# Patient Record
Sex: FEMALE | Race: WHITE | NOT HISPANIC OR LATINO | Employment: UNEMPLOYED | URBAN - NONMETROPOLITAN AREA
[De-identification: names, ages, dates, MRNs, and addresses within clinical notes are randomized per-mention and may not be internally consistent; named-entity substitution may affect disease eponyms.]

---

## 2017-08-29 RX ORDER — ACETAMINOPHEN AND CODEINE PHOSPHATE 120; 12 MG/5ML; MG/5ML
1 SOLUTION ORAL DAILY
Qty: 28 TABLET | Refills: 1 | Status: SHIPPED | OUTPATIENT
Start: 2017-08-29 | End: 2017-08-30 | Stop reason: SDUPTHER

## 2017-08-29 RX ORDER — NORETHINDRONE ACETATE AND ETHINYL ESTRADIOL 1; .02 MG/1; MG/1
1 TABLET ORAL DAILY
Qty: 21 TABLET | Refills: 2 | Status: CANCELLED | OUTPATIENT
Start: 2017-08-29

## 2017-08-29 RX ORDER — ACETAMINOPHEN AND CODEINE PHOSPHATE 120; 12 MG/5ML; MG/5ML
1 SOLUTION ORAL DAILY
Qty: 28 TABLET | Refills: 1 | Status: SHIPPED | OUTPATIENT
Start: 2017-08-29 | End: 2017-08-29 | Stop reason: SDUPTHER

## 2017-08-29 NOTE — TELEPHONE ENCOUNTER
I am not sure where microgestin came from as I went and reviewed old chart and patient was on micronor minipill-I called patient to confirm and she is on micronor not microgestin so may send in 2 months refills on micronor-thanks

## 2017-08-30 RX ORDER — ACETAMINOPHEN AND CODEINE PHOSPHATE 120; 12 MG/5ML; MG/5ML
1 SOLUTION ORAL DAILY
Qty: 28 TABLET | Refills: 1 | Status: SHIPPED | OUTPATIENT
Start: 2017-08-30 | End: 2017-10-12 | Stop reason: SDUPTHER

## 2017-08-30 RX ORDER — ACETAMINOPHEN AND CODEINE PHOSPHATE 120; 12 MG/5ML; MG/5ML
1 SOLUTION ORAL DAILY
Qty: 28 TABLET | Refills: 1 | Status: SHIPPED | OUTPATIENT
Start: 2017-08-30 | End: 2017-08-30 | Stop reason: SDUPTHER

## 2017-10-12 ENCOUNTER — OFFICE VISIT (OUTPATIENT)
Dept: OBSTETRICS AND GYNECOLOGY | Facility: CLINIC | Age: 27
End: 2017-10-12

## 2017-10-12 VITALS
DIASTOLIC BLOOD PRESSURE: 62 MMHG | WEIGHT: 136 LBS | HEIGHT: 63 IN | SYSTOLIC BLOOD PRESSURE: 112 MMHG | BODY MASS INDEX: 24.1 KG/M2

## 2017-10-12 DIAGNOSIS — Z12.4 SCREENING FOR MALIGNANT NEOPLASM OF CERVIX: ICD-10-CM

## 2017-10-12 DIAGNOSIS — L70.9 ACNE, UNSPECIFIED ACNE TYPE: ICD-10-CM

## 2017-10-12 DIAGNOSIS — R79.89 ELEVATED TESTOSTERONE LEVEL IN FEMALE: ICD-10-CM

## 2017-10-12 DIAGNOSIS — Z01.419 ENCOUNTER FOR GYNECOLOGICAL EXAMINATION WITHOUT ABNORMAL FINDING: Primary | ICD-10-CM

## 2017-10-12 PROCEDURE — 99395 PREV VISIT EST AGE 18-39: CPT | Performed by: PHYSICIAN ASSISTANT

## 2017-10-12 RX ORDER — ACETAMINOPHEN AND CODEINE PHOSPHATE 120; 12 MG/5ML; MG/5ML
1 SOLUTION ORAL DAILY
Qty: 28 TABLET | Refills: 12 | Status: SHIPPED | OUTPATIENT
Start: 2017-10-12 | End: 2018-04-25

## 2017-10-12 NOTE — PATIENT INSTRUCTIONS
Will rto for fasting labs  Pending results trial of spironolactone  If DHEAS levels higher than previously found will repeat imaging

## 2017-10-12 NOTE — PROGRESS NOTES
Subjective   Chief Complaint   Patient presents with   • Gynecologic Exam     wants to discuss previous 2015 abnormal DHEA level.    • Contraception     Needs refills on Micronor       Karmen Tracey is a 27 y.o. year old  presenting to be seen for her annual gynecological exam.   She is  and using micronor for birth control and had elevated blood pressure on combination pills in the past  She occasionally has irregular bleeding with minipill  She had had abnormal hormone levels in the past with elevated testosterone and DHEAS-has seen endocrinologist with multiple repeat labs and persistently elevated level of DHEAS 700-900 level  She had negative pelvic ultrasound, negative CT scan of adrenal and negative MRI  Patient reports persistent severe acne which has been problematic for several years   Patient's last menstrual period was 2017.    Past Medical History:   Diagnosis Date   • Anxiety    • History of Papanicolaou smear of cervix     normal   • Oral contraceptive use    • Personal history of leukemia         Current Outpatient Prescriptions:   •  cetirizine (ZYRTEC ALLERGY) 10 MG tablet, Take  by mouth daily., Disp: , Rfl:   •  Multiple Vitamins tablet, Take  by mouth daily., Disp: , Rfl:   •  norethindrone (MICRONOR) 0.35 MG tablet, Take 1 tablet by mouth Daily., Disp: 28 tablet, Rfl: 12   Allergies   Allergen Reactions   • Amoxicillin-Pot Clavulanate    • Cefaclor       Past Surgical History:   Procedure Laterality Date   • ADENOIDECTOMY     • TONSILLECTOMY     • WISDOM TOOTH EXTRACTION        Social History     Social History   • Marital status:      Spouse name: N/A   • Number of children: N/A   • Years of education: N/A     Occupational History   • Not on file.     Social History Main Topics   • Smoking status: Never Smoker   • Smokeless tobacco: Never Used   • Alcohol use No      Comment: Social alcohol use   • Drug use: No   • Sexual activity: Yes      "Partners: Male     Birth control/ protection: OCP     Other Topics Concern   • Not on file     Social History Narrative      Family History   Problem Relation Age of Onset   • Hypertension Other    • Migraines Other    • Diabetes Other    • Arthritis Other    • Cancer Other    • Stroke Other    • Heart attack Other    • Breast cancer Maternal Grandmother        Review of Systems   Constitutional: Negative.    Gastrointestinal: Negative.    Genitourinary: Negative.            Objective   /62  Ht 63\" (160 cm)  Wt 136 lb (61.7 kg)  LMP 09/21/2017  Breastfeeding? No  BMI 24.09 kg/m2    Physical Exam   Constitutional: She appears well-developed and well-nourished. She is cooperative.   Pulmonary/Chest: Right breast exhibits no inverted nipple, no mass, no nipple discharge, no skin change and no tenderness. Left breast exhibits no inverted nipple, no mass, no nipple discharge, no skin change and no tenderness.   Abdominal: Soft. Normal appearance. There is no hepatosplenomegaly. There is no tenderness.   Genitourinary: Vagina normal and uterus normal. There is no tenderness or lesion on the right labia. There is no tenderness or lesion on the left labia. Cervix exhibits no motion tenderness and no discharge. Right adnexum displays no mass and no tenderness. Left adnexum displays no mass and no tenderness.   Neurological: She is alert.   Skin: Skin is warm, dry and intact.   Psychiatric: She has a normal mood and affect. Her behavior is normal.            Assessment and Plan  Karmen was seen today for gynecologic exam and contraception.    Diagnoses and all orders for this visit:    Encounter for gynecological examination without abnormal finding    Screening for malignant neoplasm of cervix  -     Liquid-based Pap Smear, Screening - ThinPrep Vial, Cervix; Future    Acne, unspecified acne type    Elevated testosterone level in female  -     Basic Metabolic Panel; Future  -     Insulin, Total; Future  -     " Testosterone (Free & Total), LC / MS; Future  -     DHEA-Sulfate; Future    Other orders  -     norethindrone (MICRONOR) 0.35 MG tablet; Take 1 tablet by mouth Daily.      Patient Instructions   Will rto for fasting labs  Pending results trial of spironolactone  If DHEAS levels higher than previously found will repeat imaging              This note was electronically signed.    Sushma Prakash PA-C   October 12, 2017

## 2017-10-17 ENCOUNTER — RESULTS ENCOUNTER (OUTPATIENT)
Dept: OBSTETRICS AND GYNECOLOGY | Facility: CLINIC | Age: 27
End: 2017-10-17

## 2017-10-17 DIAGNOSIS — R79.89 ELEVATED TESTOSTERONE LEVEL IN FEMALE: ICD-10-CM

## 2017-10-24 DIAGNOSIS — Z12.4 SCREENING FOR MALIGNANT NEOPLASM OF CERVIX: ICD-10-CM

## 2017-10-25 ENCOUNTER — TELEPHONE (OUTPATIENT)
Dept: OBSTETRICS AND GYNECOLOGY | Facility: CLINIC | Age: 27
End: 2017-10-25

## 2017-10-25 DIAGNOSIS — N63.0 BREAST NODULE: Primary | ICD-10-CM

## 2017-10-25 NOTE — TELEPHONE ENCOUNTER
----- Message from Stacy Carey sent at 10/25/2017 10:11 AM EDT -----  Contact: PT  THIS IS PAT'S PT.  SHE IS HAVING A 6 MONTH F/U LEFT BREAST US AND NEEDS ORDER FAXED TO [facility] [phone].  THANKS

## 2017-11-11 LAB
BUN SERPL-MCNC: 11 MG/DL (ref 7–20)
BUN/CREAT SERPL: 13.8 (ref 7.1–23.5)
CALCIUM SERPL-MCNC: 9.9 MG/DL (ref 8.4–10.2)
CHLORIDE SERPL-SCNC: 105 MMOL/L (ref 98–107)
CO2 SERPL-SCNC: 24 MMOL/L (ref 26–30)
CREAT SERPL-MCNC: 0.8 MG/DL (ref 0.6–1.3)
DHEA-S SERPL-MCNC: 27.2 UG/DL (ref 84.8–378)
GFR SERPLBLD CREATININE-BSD FMLA CKD-EPI: 105 ML/MIN/1.73
GFR SERPLBLD CREATININE-BSD FMLA CKD-EPI: 87 ML/MIN/1.73
GLUCOSE SERPL-MCNC: 93 MG/DL (ref 74–98)
INSULIN SERPL-ACNC: 7.1 UIU/ML (ref 2.6–24.9)
POTASSIUM SERPL-SCNC: 4 MMOL/L (ref 3.5–5.1)
SODIUM SERPL-SCNC: 144 MMOL/L (ref 137–145)
TESTOST FREE SERPL-MCNC: 4.7 PG/ML (ref 0–4.2)
TESTOST SERPL-MCNC: 41.6 NG/DL (ref 10–55)

## 2017-11-17 ENCOUNTER — TELEPHONE (OUTPATIENT)
Dept: OBSTETRICS AND GYNECOLOGY | Facility: CLINIC | Age: 27
End: 2017-11-17

## 2017-11-17 RX ORDER — SPIRONOLACTONE 25 MG/1
25 TABLET ORAL DAILY
Qty: 30 TABLET | Refills: 2 | Status: SHIPPED | OUTPATIENT
Start: 2017-11-17 | End: 2018-04-25

## 2017-11-17 NOTE — TELEPHONE ENCOUNTER
----- Message from Stacy Carey sent at 11/17/2017 10:48 AM EST -----  Contact: PT  THIS IS PAT'S PT.  SHE IS WANTING TO SPEAK WITH PAT REGARDING HER BIRTH CONTROL.  THANKS

## 2017-11-20 ENCOUNTER — TELEPHONE (OUTPATIENT)
Dept: OBSTETRICS AND GYNECOLOGY | Facility: CLINIC | Age: 27
End: 2017-11-20

## 2017-11-20 NOTE — TELEPHONE ENCOUNTER
Patient called back and wanted to speak with you regarding her DHEA level. She advised has been very elevated for over 2 years and wanted to see if it would be normal for it to drop so much and be normal that fast?

## 2017-11-21 NOTE — TELEPHONE ENCOUNTER
Spoke with patient and reassurred not worrisome for DHEAS level but am not sure why it dropped so significantly after being so elevated for a long time-she can still have option of rechecking level when testosterone is rechecked in 3 months

## 2018-04-25 ENCOUNTER — INITIAL PRENATAL (OUTPATIENT)
Dept: OBSTETRICS AND GYNECOLOGY | Facility: CLINIC | Age: 28
End: 2018-04-25

## 2018-04-25 VITALS — WEIGHT: 143 LBS | BODY MASS INDEX: 25.33 KG/M2 | SYSTOLIC BLOOD PRESSURE: 134 MMHG | DIASTOLIC BLOOD PRESSURE: 86 MMHG

## 2018-04-25 DIAGNOSIS — O36.80X0 ENCOUNTER TO DETERMINE FETAL VIABILITY OF PREGNANCY, SINGLE OR UNSPECIFIED FETUS: Primary | ICD-10-CM

## 2018-04-25 PROCEDURE — 0501F PRENATAL FLOW SHEET: CPT | Performed by: NURSE PRACTITIONER

## 2018-04-25 RX ORDER — PRENATAL VIT/IRON FUM/FOLIC AC 27MG-0.8MG
TABLET ORAL DAILY
COMMUNITY

## 2018-04-25 NOTE — PROGRESS NOTES
Chief Complaint   Patient presents with   • Initial Prenatal Visit     LMP /18, 9w2d by scan today, questions about acne and use of creams for stretch marks         HPI  , 9w2d presents to our office today for initial prenatal visit.  She reports 1st trimester mild discomforts of pregnancy including, fatigue, irritable &/or emotional and nausea and vomiting.  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 - see's oncologist for labs yearly - no complications            Past Medical History:   Diagnosis Date   • Anxiety    • History of Papanicolaou smear of cervix     normal   • Oral contraceptive use    • Personal history of leukemia         Current Outpatient Prescriptions:   •  cetirizine (ZYRTEC ALLERGY) 10 MG tablet, Take  by mouth daily., Disp: , Rfl:   •  Prenatal Vit-Fe Fumarate-FA (PRENATAL VITAMIN 27-0.8) 27-0.8 MG tablet tablet, Take  by mouth Daily., Disp: , Rfl:    Allergies   Allergen Reactions   • Amoxicillin-Pot Clavulanate    • Cefaclor       Past Surgical History:   Procedure Laterality Date   • ADENOIDECTOMY     • BREAST LUMPECTOMY Left 2018    benign    • TONSILLECTOMY     • WISDOM TOOTH EXTRACTION         Social History     Social History   • Marital status:      Spouse name: N/A   • Number of children: N/A   • Years of education: N/A     Occupational History   • Not on file.     Social History Main Topics   • Smoking  status: Never Smoker   • Smokeless tobacco: Never Used   • Alcohol use No      Comment: Social alcohol use   • Drug use: No   • Sexual activity: Yes     Partners: Male     Birth control/ protection: None     Other Topics Concern   • Not on file     Social History Narrative   • No narrative on file      Family History   Problem Relation Age of Onset   • Hypertension Other    • Migraines Other    • Diabetes Other    • Arthritis Other    • Cancer Other    • Stroke Other    • Heart attack Other    • Breast cancer Maternal Grandmother        The following portions of the patient's history were reviewed and updated as appropriate:problem list, current medications, allergies, past family history, past medical history, past social history and past surgical history.    ROS    Pertinent items are noted in HPI, all other systems reviewed and negative    Physical Exam  /86   Wt 64.9 kg (143 lb)   LMP 02/19/2018   BMI 25.33 kg/m²        Psych: Altert and oriented to time, place and person  Mood and affect appropriate   General: well developed; well nourished  no acute distress  Head: normocephalic  Neck: The neck is supple and the trachea is midline  Musculoskeletal: Normal gait  Full range of motion  Lungs:  breathing is unlabored  Back: Negative CVAT  Abdomen: Soft, non-tender, no organomegaly  Lower Extremities: LE: Neg edema  Genitourinary: Perineum is without inflammation or lesions      MDM  Impression:  Problems/Risks: Pregnancy with Active Problems(s) &/or Complication(s)  hx of Hypertension   hx of leukemia ag 2 / chemo    Discomforts of pregnancy   Tests done today: TVS rev'd    Topics discussed: Juan WRIGHT education including nutrition, exercise, OTCmeds, genetic screening   discussed TVS and will rept at next visit     option to do BP cks at home as has BP cuff at home    Tests next visit: none

## 2018-04-25 NOTE — PATIENT INSTRUCTIONS
First Trimester of Pregnancy    The first trimester of pregnancy is from week 1 until the end of week 12 (months 1 through 3). During this time, your baby will begin to develop inside you. At 6-8 weeks, the eyes and face are formed, and the heartbeat can be seen on ultrasound. At the end of 12 weeks, all the baby's organs are formed. Prenatal care is all the medical care you receive before the birth of your baby. Make sure you get good prenatal care and follow all of your doctor's instructions.  HOME CARE   Medicines  · Take medicine only as told by your doctor. Some medicines are safe and some are not during pregnancy.  · Take your prenatal vitamins as told by your doctor.  · Take medicine that helps you poop (stool softener) as needed if your doctor says it is okay.  Diet  · Eat regular, healthy meals.  · Your doctor will tell you the amount of weight gain that is right for you.  · Avoid raw meat and uncooked cheese.  · If you feel sick to your stomach (nauseous) or throw up (vomit):  ¨ Eat 4 or 5 small meals a day instead of 3 large meals.  ¨ Try eating a few soda crackers.  ¨ Drink liquids between meals instead of during meals.  · If you have a hard time pooping (constipation):  ¨ Eat high-fiber foods like fresh vegetables, fruit, and whole grains.  ¨ Drink enough fluids to keep your pee (urine) clear or pale yellow.  Activity and Exercise  · Exercise only as told by your doctor. Stop exercising if you have cramps or pain in your lower belly (abdomen) or low back.  · Try to avoid standing for long periods of time. Move your legs often if you must  one place for a long time.  · Avoid heavy lifting.  · Wear low-heeled shoes. Sit and stand up straight.  · You can have sex unless your doctor tells you not to.  Relief of Pain or Discomfort  · Wear a good support bra if your breasts are sore.  · Take warm water baths (sitz baths) to soothe pain or discomfort caused by hemorrhoids. Use hemorrhoid cream if your  doctor says it is okay.  · Rest with your legs raised if you have leg cramps or low back pain.  · Wear support hose if you have puffy, bulging veins (varicose veins) in your legs. Raise (elevate) your feet for 15 minutes, 3-4 times a day. Limit salt in your diet.  Prenatal Care  · Schedule your prenatal visits by the twelfth week of pregnancy.  · Write down your questions. Take them to your prenatal visits.  · Keep all your prenatal visits as told by your doctor.  Safety  · Wear your seat belt at all times when driving.  · Make a list of emergency phone numbers. The list should include numbers for family, friends, the hospital, and police and fire departments.  General Tips  · Ask your doctor for a referral to a local prenatal class. Begin classes no later than at the start of month 6 of your pregnancy.  · Ask for help if you need counseling or help with nutrition. Your doctor can give you advice or tell you where to go for help.  · Do not use hot tubs, steam rooms, or saunas.  · Do not douche or use tampons or scented sanitary pads.  · Do not cross your legs for long periods of time.  · Avoid litter boxes and soil used by cats.  · Avoid all smoking, herbs, and alcohol. Avoid drugs not approved by your doctor.  · Do not use any tobacco products, including cigarettes, chewing tobacco, and electronic cigarettes. If you need help quitting, ask your doctor. You may get counseling or other support to help you quit.  · Visit your dentist. At home, brush your teeth with a soft toothbrush. Be gentle when you floss.  GET HELP IF:  · You are dizzy.  · You have mild cramps or pressure in your lower belly.  · You have a nagging pain in your belly area.  · You continue to feel sick to your stomach, throw up, or have watery poop (diarrhea).  · You have a bad smelling fluid coming from your vagina.  · You have pain with peeing (urination).  · You have increased puffiness (swelling) in your face, hands, legs, or ankles.  GET HELP  RIGHT AWAY IF:   · You have a fever.  · You are leaking fluid from your vagina.  · You have spotting or bleeding from your vagina.  · You have very bad belly cramping or pain.  · You gain or lose weight rapidly.  · You throw up blood. It may look like coffee grounds.  · You are around people who have [place] measles, fifth disease, or chickenpox.  · You have a very bad headache.  · You have shortness of breath.  · You have any kind of trauma, such as from a fall or a car accident.     This information is not intended to replace advice given to you by your health care provider. Make sure you discuss any questions you have with your health care provider.

## 2018-04-26 LAB
ABO GROUP BLD: (no result)
BACTERIA UR CULT: NORMAL
BACTERIA UR CULT: NORMAL
BASOPHILS # BLD AUTO: 0 X10E3/UL (ref 0–0.2)
BASOPHILS NFR BLD AUTO: 0 %
BLD GP AB SCN SERPL QL: NEGATIVE
EOSINOPHIL # BLD AUTO: 0.1 X10E3/UL (ref 0–0.4)
EOSINOPHIL NFR BLD AUTO: 1 %
ERYTHROCYTE [DISTWIDTH] IN BLOOD BY AUTOMATED COUNT: 13 % (ref 12.3–15.4)
HBV SURFACE AG SERPL QL IA: NEGATIVE
HCT VFR BLD AUTO: 36.9 % (ref 34–46.6)
HGB BLD-MCNC: 12.7 G/DL (ref 11.1–15.9)
HIV 1+2 AB+HIV1 P24 AG SERPL QL IA: NON REACTIVE
IMM GRANULOCYTES # BLD: 0 X10E3/UL (ref 0–0.1)
IMM GRANULOCYTES NFR BLD: 0 %
LYMPHOCYTES # BLD AUTO: 1.2 X10E3/UL (ref 0.7–3.1)
LYMPHOCYTES NFR BLD AUTO: 12 %
MCH RBC QN AUTO: 30.8 PG (ref 26.6–33)
MCHC RBC AUTO-ENTMCNC: 34.4 G/DL (ref 31.5–35.7)
MCV RBC AUTO: 90 FL (ref 79–97)
MONOCYTES # BLD AUTO: 0.5 X10E3/UL (ref 0.1–0.9)
MONOCYTES NFR BLD AUTO: 5 %
NEUTROPHILS # BLD AUTO: 8.2 X10E3/UL (ref 1.4–7)
NEUTROPHILS NFR BLD AUTO: 82 %
PLATELET # BLD AUTO: 349 X10E3/UL (ref 150–379)
RBC # BLD AUTO: 4.12 X10E6/UL (ref 3.77–5.28)
RH BLD: POSITIVE
RPR SER QL: NON REACTIVE
RUBV IGG SERPL IA-ACNC: 1.86 INDEX
WBC # BLD AUTO: 10 X10E3/UL (ref 3.4–10.8)

## 2018-04-27 LAB
C TRACH RRNA SPEC QL NAA+PROBE: NEGATIVE
N GONORRHOEA RRNA SPEC QL NAA+PROBE: NEGATIVE

## 2018-05-16 ENCOUNTER — ROUTINE PRENATAL (OUTPATIENT)
Dept: OBSTETRICS AND GYNECOLOGY | Facility: CLINIC | Age: 28
End: 2018-05-16

## 2018-05-16 VITALS — BODY MASS INDEX: 25.15 KG/M2 | WEIGHT: 142 LBS | DIASTOLIC BLOOD PRESSURE: 84 MMHG | SYSTOLIC BLOOD PRESSURE: 144 MMHG

## 2018-05-16 DIAGNOSIS — Z34.91 PREGNANT AND NOT YET DELIVERED IN FIRST TRIMESTER: Primary | ICD-10-CM

## 2018-05-16 DIAGNOSIS — Z34.91 NORMAL PREGNANCY, FIRST TRIMESTER: ICD-10-CM

## 2018-05-16 PROCEDURE — 0502F SUBSEQUENT PRENATAL CARE: CPT | Performed by: NURSE PRACTITIONER

## 2018-05-16 NOTE — PROGRESS NOTES
[id number]  Chief Complaint   Patient presents with   • Pregnancy Problem     c/o brown colored spotting this morning, states had bright red bleeding at around 10 weeks gestation then again this past 18        HPI  , 12w2d reports vaginal bleeding at 10 wks gestation - small gush of blood after intercourse & continued with wiping 3 times & resolved. She states this past  again + bleeding after intercourse - just with wiping x 1 or 2.  Today noticed dark blood after urinating all day.  This AM she had cramping upon awakening this AM but resolved after an hour.        ROS:  GI: Nausea - No; Constipation - No; Diarrhea - No    Neuro: Headache - No; Visual change - No        EXAM  BP monica 122/82    General Appearance:  Lungs: Breathing unlabored  Abdomen:  See flow sheet for Fundal ht, FM, FHT's  LE: Neg edema    MDM  Impression:  Problems/Risk: Pregnancy with Active Problems(s) &/or Complication(s)  vaginal bleeding    Tests done today: none   Topics discussed: adequate rest and fluids  pelvic rest  TVS today or this wk    encouraged questions - call prn    Tests next visit:      OB History      Para Term  AB Living    1 0 0 0 0 0    SAB TAB Ectopic Molar Multiple Live Births    0 0 0 0 0 0          Past Medical History:   Diagnosis Date   • Anxiety    • History of Papanicolaou smear of cervix     normal   • Oral contraceptive use    • Personal history of leukemia        Past Surgical History:   Procedure Laterality Date   • ADENOIDECTOMY     • BREAST LUMPECTOMY Left 2018    benign    • TONSILLECTOMY     • WISDOM TOOTH EXTRACTION         Family History   Problem Relation Age of Onset   • Hypertension Other    • Migraines Other    • Diabetes Other    • Arthritis Other    • Cancer Other    • Stroke Other    • Heart attack Other    • Breast cancer Maternal Grandmother        Social History     Social History   • Marital status:      Spouse name: N/A   • Number of  children: N/A   • Years of education: N/A     Occupational History   • Not on file.     Social History Main Topics   • Smoking status: Never Smoker   • Smokeless tobacco: Never Used   • Alcohol use No      Comment: Social alcohol use   • Drug use: No   • Sexual activity: Yes     Partners: Male     Birth control/ protection: None     Other Topics Concern   • Not on file     Social History Narrative   • No narrative on file

## 2018-05-18 ENCOUNTER — ROUTINE PRENATAL (OUTPATIENT)
Dept: OBSTETRICS AND GYNECOLOGY | Facility: CLINIC | Age: 28
End: 2018-05-18

## 2018-05-18 VITALS — SYSTOLIC BLOOD PRESSURE: 116 MMHG | BODY MASS INDEX: 25.51 KG/M2 | DIASTOLIC BLOOD PRESSURE: 60 MMHG | WEIGHT: 144 LBS

## 2018-05-18 DIAGNOSIS — O20.0 THREATENED ABORTION: ICD-10-CM

## 2018-05-18 DIAGNOSIS — Z34.01 ENCOUNTER FOR SUPERVISION OF NORMAL FIRST PREGNANCY IN FIRST TRIMESTER: Primary | ICD-10-CM

## 2018-05-18 PROBLEM — Z34.90 SUPERVISION OF NORMAL PREGNANCY: Status: ACTIVE | Noted: 2018-05-18

## 2018-05-18 PROCEDURE — 99213 OFFICE O/P EST LOW 20 MIN: CPT | Performed by: OBSTETRICS & GYNECOLOGY

## 2018-05-18 NOTE — PROGRESS NOTES
Chief Complaint   Patient presents with   • Routine Prenatal Visit     PATIENT COMPLAINS OF SPOTTING, TRACE OF BLOOD IN URINE DIP TODAY.        HPI: Karmen is a  currently at 12w4d who today reports the following:  Contractions - No; Leaking - No; Vaginal bleeding -  YES; Heartburn - No.  Pt with continued spotting but reports dark brown in nature.  Pt has had two episodes of spotting during pregnancy.  Pt had intercourse before the bleeding.  Pt with no pain or cramping.  Pt did have CRISPIN on previous scan.  ROS:   GI:   Nausea - No; Constipation - No; Diarrhea - No.   Neuro:  Headache - No; Visual disturbances - No.    EXAM:   Vitals:  See prenatal flowsheet as noted and reviewed   Abdomen:   See prenatal flowsheet as noted and reviewed   Pelvic:  See prenatal flowsheet as noted and reviewed   Urine:  See prenatal flowsheet as noted and reviewed     Lab Results   Component Value Date    ABO A 2018    RH Positive 2018    ABSCRN Negative 2018       MDM:  Impression: Supervision of low risk pregnancy  Threated ab   Tests done today: U/S for for evaluation of bleeding and f/u CRISPIN; scan today shows SVIUP placenta anterior; cervix normal.  NO CRISPIN seen.   Topics discussed: ab precautions given  Reassurance given  Recommend continued pelvic rest at present   Tests next visit: none     This note was electronically signed.  Velma Sosa M.D.

## 2018-05-29 ENCOUNTER — TELEPHONE (OUTPATIENT)
Dept: OBSTETRICS AND GYNECOLOGY | Facility: CLINIC | Age: 28
End: 2018-05-29

## 2018-05-29 ENCOUNTER — ROUTINE PRENATAL (OUTPATIENT)
Dept: OBSTETRICS AND GYNECOLOGY | Facility: CLINIC | Age: 28
End: 2018-05-29

## 2018-05-29 VITALS — SYSTOLIC BLOOD PRESSURE: 119 MMHG | BODY MASS INDEX: 25.86 KG/M2 | DIASTOLIC BLOOD PRESSURE: 60 MMHG | WEIGHT: 146 LBS

## 2018-05-29 DIAGNOSIS — Z34.02 ENCOUNTER FOR SUPERVISION OF LOW-RISK FIRST PREGNANCY IN SECOND TRIMESTER: Primary | ICD-10-CM

## 2018-05-29 PROCEDURE — 0502F SUBSEQUENT PRENATAL CARE: CPT | Performed by: OBSTETRICS & GYNECOLOGY

## 2018-05-29 RX ORDER — CLINDAMYCIN PHOSPHATE 10 MG/G
GEL TOPICAL EVERY 12 HOURS SCHEDULED
Qty: 60 G | Refills: 3 | Status: SHIPPED | OUTPATIENT
Start: 2018-05-29 | End: 2018-08-23

## 2018-05-29 NOTE — TELEPHONE ENCOUNTER
CALLED AND SPOKE WITH PHARMACY, ADVISED WAS SAME DOSE JUST DIFFERENT CONSISTENCY, BOTH ARE TOPICAL. ADVISED OKAY TO CHANGE.

## 2018-05-29 NOTE — TELEPHONE ENCOUNTER
----- Message from Luz Elena Taveras sent at 5/29/2018  4:13 PM EDT -----  Contact: [facility] pharmacy called about patients prescription of clindamycin 1% gel. Patient will have a 90$ co-pay. Is it possible to switch from the gel to the solution? Patient's co-pay will only be 20$ with the solution.       [facility] pharmacy, [phone]

## 2018-05-29 NOTE — PROGRESS NOTES
Chief Complaint   Patient presents with   • Routine Prenatal Visit     NO COMPLAINTS.        HPI: Karmen is a  currently at 14w1d who today reports the following:  Contractions - No; Leaking - No; Vaginal bleeding -  No; Heartburn - No.  Pt with no bleeding or spotting since last visit.  Pt has had occ cramps but not severe.    ROS:   GI:   Nausea - No; Constipation - No; Diarrhea - No.   Neuro:  Headache - No; Visual disturbances - No.    EXAM:   Vitals:  See prenatal flowsheet as noted and reviewed   Abdomen:   See prenatal flowsheet as noted and reviewed   Pelvic:  See prenatal flowsheet as noted and reviewed   Urine:  See prenatal flowsheet as noted and reviewed     Lab Results   Component Value Date    ABO A 2018    RH Positive 2018    ABSCRN Negative 2018       MDM:  Impression: Supervision of low risk pregnancy  Previous first trimester bleeding with CRISPIN; resolved   Tests done today: discussed genetic testing; pt declines today   Topics discussed: ab precautions  pt may resume normal activities; instructions and precautions given   Tests next visit: U/S for anatomic screening     This note was electronically signed.  Velma Sosa M.D.

## 2018-06-25 ENCOUNTER — ROUTINE PRENATAL (OUTPATIENT)
Dept: OBSTETRICS AND GYNECOLOGY | Facility: CLINIC | Age: 28
End: 2018-06-25

## 2018-06-25 VITALS — SYSTOLIC BLOOD PRESSURE: 142 MMHG | WEIGHT: 147 LBS | DIASTOLIC BLOOD PRESSURE: 80 MMHG | BODY MASS INDEX: 26.04 KG/M2

## 2018-06-25 DIAGNOSIS — Z34.01 ENCOUNTER FOR SUPERVISION OF NORMAL FIRST PREGNANCY IN FIRST TRIMESTER: Primary | ICD-10-CM

## 2018-06-25 PROCEDURE — 0502F SUBSEQUENT PRENATAL CARE: CPT | Performed by: OBSTETRICS & GYNECOLOGY

## 2018-06-25 NOTE — PROGRESS NOTES
Chief Complaint   Patient presents with   • Routine Prenatal Visit     ANATOMY SCAN TODAY.         HPI:   , 18w0d gestation reports doing well    ROS:  See Prenatal Episode/Flowsheet  /80   Wt 66.7 kg (147 lb)   LMP 2018   BMI 26.04 kg/m²      EXAM:  EXTREMITIES:  No swelling-See Prenatal Episode/Flowsheet    ABDOMEN:  FHTs/Movement noted-See Prenatal Episode/Flowsheet    URINE GLUCOSE/PROTEIN:  See Prenatal Episode/Flowsheet    PELVIC EXAM:  See Prenatal Episode/Flowsheet  CV:  Lungs:    MDM:    Lab Results   Component Value Date    HGB 12.7 2018    RUBELLAABIGG 1.86 2018    HEPBSAG Negative 2018    ABO A 2018    RH Positive 2018    ABSCRN Negative 2018    [id number] Non Reactive 2018    URINECX Final report 2018       U/S:Images reviewed.  A 6 4th percentile.  Symmetric growth.  Vertex.  Placenta is anterior 1.5 cm from the cervix indicating low-lying status.,  Labs are normal.  Patient declines a genetic testing given normal ultrasound.  With low-lying placenta recommend a pelvic rest.  Will repeat TVS check on placentation at 26 weeks with the Glucola.     1. IUP 18w0d  2. Routine care

## 2018-06-27 ENCOUNTER — TELEPHONE (OUTPATIENT)
Dept: OBSTETRICS AND GYNECOLOGY | Facility: CLINIC | Age: 28
End: 2018-06-27

## 2018-06-27 NOTE — TELEPHONE ENCOUNTER
----- Message from Karmen Tracey sent at 6/27/2018  9:49 AM EDT -----  Regarding: Visit Follow-Up Question  Contact: [phone]  I was told at my last appointment that I have a low lying placenta, but it was the anatomy scan and I was pretty excited and trying to take everything in. Having that said, I forgot a lot of questions I was going to ask. I was wondering if Dr. Sosa could look back at my US and see exactly how low lying it was and if there is any restrictions or precautions I need to be aware of? Do I need to be on pelvic rest, can I be working as normal, etc? I'm just just unsure of exactly what a low lying placenta can mean and what I need to do.    Thank you!

## 2018-06-28 NOTE — TELEPHONE ENCOUNTER
Okay to inform pt scan showed placenta 1.5 cm away from cervix so not a previa but low lying; should resolve as pregnancy progresses.  Recommend pelvic rest meaning nothing in vagina until f/u scan.  Also recommend no heavy lifting or straining.  Pt to f/u if any bleeding but highly likely will resolve and not be a problem for later in pregnancy.

## 2018-07-23 ENCOUNTER — ROUTINE PRENATAL (OUTPATIENT)
Dept: OBSTETRICS AND GYNECOLOGY | Facility: CLINIC | Age: 28
End: 2018-07-23

## 2018-07-23 VITALS — BODY MASS INDEX: 27.46 KG/M2 | DIASTOLIC BLOOD PRESSURE: 82 MMHG | SYSTOLIC BLOOD PRESSURE: 142 MMHG | WEIGHT: 155 LBS

## 2018-07-23 DIAGNOSIS — Z34.92 SECOND TRIMESTER PREGNANCY: Primary | ICD-10-CM

## 2018-07-23 PROCEDURE — 0502F SUBSEQUENT PRENATAL CARE: CPT | Performed by: OBSTETRICS & GYNECOLOGY

## 2018-07-23 NOTE — PROGRESS NOTES
Chief Complaint   Patient presents with   • Routine Prenatal Visit     No Complaints        HPI:   , 22w0d gestation reports doing well    ROS:  See Prenatal Episode/Flowsheet  /82   Wt 70.3 kg (155 lb)   LMP 2018   BMI 27.46 kg/m²      EXAM:  EXTREMITIES:  No swelling-See Prenatal Episode/Flowsheet    ABDOMEN:  FHTs/Movement noted-See Prenatal Episode/Flowsheet    URINE GLUCOSE/PROTEIN:  See Prenatal Episode/Flowsheet    PELVIC EXAM:  See Prenatal Episode/Flowsheet  CV:  Lungs:    MDM:    Lab Results   Component Value Date    HGB 12.7 2018    RUBELLAABIGG 1.86 2018    HEPBSAG Negative 2018    ABO A 2018    RH Positive 2018    ABSCRN Negative 2018    [id number] Non Reactive 2018    URINECX Final report 2018       U/S:    1. IUP 22w0d  2. Routine care   3. Glucola CBC nex titme  4. Discussed BP

## 2018-08-17 ENCOUNTER — TELEPHONE (OUTPATIENT)
Dept: OBSTETRICS AND GYNECOLOGY | Facility: CLINIC | Age: 28
End: 2018-08-17

## 2018-08-17 NOTE — TELEPHONE ENCOUNTER
----- Message from Karmen Tracey sent at 8/16/2018 11:03 PM EDT -----  Regarding: Prescription Question  Contact: [phone]  Hi,     I was just wondering if I could get some refills called in on my clindamycin, please? I have no more refills on my current prescription. I have an appointment next Thursday. I wasnt sure if I needed to wait until I came in to be seen, or if this could be something that was called in?    If it can just be called in, I use [facility] in [place] as the pharmacy.     Thanks!

## 2018-08-20 ENCOUNTER — RESULTS ENCOUNTER (OUTPATIENT)
Dept: OBSTETRICS AND GYNECOLOGY | Facility: CLINIC | Age: 28
End: 2018-08-20

## 2018-08-20 DIAGNOSIS — Z34.92 SECOND TRIMESTER PREGNANCY: ICD-10-CM

## 2018-08-23 ENCOUNTER — ROUTINE PRENATAL (OUTPATIENT)
Dept: OBSTETRICS AND GYNECOLOGY | Facility: CLINIC | Age: 28
End: 2018-08-23

## 2018-08-23 ENCOUNTER — RESULTS ENCOUNTER (OUTPATIENT)
Dept: OBSTETRICS AND GYNECOLOGY | Facility: CLINIC | Age: 28
End: 2018-08-23

## 2018-08-23 VITALS — SYSTOLIC BLOOD PRESSURE: 126 MMHG | BODY MASS INDEX: 27.28 KG/M2 | DIASTOLIC BLOOD PRESSURE: 74 MMHG | WEIGHT: 154 LBS

## 2018-08-23 DIAGNOSIS — Z34.02 ENCOUNTER FOR SUPERVISION OF NORMAL FIRST PREGNANCY IN SECOND TRIMESTER: Primary | ICD-10-CM

## 2018-08-23 DIAGNOSIS — Z34.92 SECOND TRIMESTER PREGNANCY: ICD-10-CM

## 2018-08-23 LAB
ERYTHROCYTE [DISTWIDTH] IN BLOOD BY AUTOMATED COUNT: 13 % (ref 11.5–14.5)
GLUCOSE 1H P 50 G GLC PO SERPL-MCNC: 90 MG/DL
HCT VFR BLD AUTO: 35.3 % (ref 37–47)
HGB BLD-MCNC: 11.6 G/DL (ref 12–16)
MCH RBC QN AUTO: 31.1 PG (ref 27–31)
MCHC RBC AUTO-ENTMCNC: 32.9 G/DL (ref 30–37)
MCV RBC AUTO: 94.6 FL (ref 81–99)
PLATELET # BLD AUTO: 308 10*3/MM3 (ref 130–400)
RBC # BLD AUTO: 3.73 10*6/MM3 (ref 4.2–5.4)
WBC # BLD AUTO: 11.54 10*3/MM3 (ref 4.8–10.8)

## 2018-08-23 PROCEDURE — 0502F SUBSEQUENT PRENATAL CARE: CPT | Performed by: MIDWIFE

## 2018-08-23 NOTE — PROGRESS NOTES
Chief Complaint   Patient presents with   • Routine Prenatal Visit     TVS and glucola today, no complaints, has records of at home BP readings       HPI: Karmen is a  currently at 26w3d who today reports the following:   Leaking - No; Heartburn - No. Baby is active. She has been taking BP @ home, most have been under 120s/80s. She denies any headaches or symptoms of hypertension. She is having some left sciatica discomfort.    ROS:   GI:   Nausea - No; Constipation - No;    Neuro:  Headache - No; Visual disturbances - No.    EXAM:   Vitals:  See prenatal flowsheet, /74, Wt -1#   Abdomen:   See prenatal flowsheet, soft, nontender   Pelvic:  See prenatal flowsheet   Urine:  See prenatal flowsheet    Lab Results   Component Value Date    ABO A 2018    RH Positive 2018    ABSCRN Negative 2018       MDM:  Impression: Supervision of low risk pregnancy  left sciatica   Tests done today: GCT  HgB  U/S for f/u of placental location - anterior, 7.6cm from the cervix   Topics discussed: kick counts and fetal movement  may resume intercourse   BP monitor checked with manual reading and it is accurate.   Tests next visit: none                RTO:                        2 weeks    This note was electronically signed.  Nettie Jewell, OLVIN  2018

## 2018-09-10 ENCOUNTER — ROUTINE PRENATAL (OUTPATIENT)
Dept: OBSTETRICS AND GYNECOLOGY | Facility: CLINIC | Age: 28
End: 2018-09-10

## 2018-09-10 VITALS — SYSTOLIC BLOOD PRESSURE: 128 MMHG | WEIGHT: 160 LBS | BODY MASS INDEX: 28.34 KG/M2 | DIASTOLIC BLOOD PRESSURE: 72 MMHG

## 2018-09-10 DIAGNOSIS — I10 ESSENTIAL HYPERTENSION: ICD-10-CM

## 2018-09-10 DIAGNOSIS — Z34.93 PRENATAL CARE IN THIRD TRIMESTER: Primary | ICD-10-CM

## 2018-09-10 PROCEDURE — 0502F SUBSEQUENT PRENATAL CARE: CPT | Performed by: OBSTETRICS & GYNECOLOGY

## 2018-09-11 NOTE — PROGRESS NOTES
Chief Complaint   Patient presents with   • Routine Prenatal Visit     NO COMPLAINTS       HPI:   Karmen is a  currently at 29w0d who today reports the following:  Contractions - No; Leaking - No; Vaginal bleeding -  No; Swelling of extremities - No. Good fetal movement - YES.    ROS:  GI: Nausea - No; Constipation - No; Diarrhea - No. RUQ pain - No    Neuro: Headache - No; Visual disturbances - No.    The following portions of the patient's history were reviewed and updated as appropriate:problem list, current medications, allergies, past family history, past medical history, past social history and past surgical history.    EXAM:  /72   Wt 72.6 kg (160 lb)   LMP 2018   BMI 28.34 kg/m²     Gen: NAD, conversant  Pulm: No use of accessory muscles, normal respirations  Abdomen: Gravid, nontender, size = dates, + fetal cardiac activity  Ext: no edema, no rashes, WWP  Gait: normal for pregnancy  Psych: Mood, insight, judgement intact  SVE: Not performed     Lab Results   Component Value Date    ABO A 2018    RH Positive 2018    ABSCRN Negative 2018       Smoking status: Never Smoker                                                              Smokeless tobacco: Never Used                          I have reviewed the prenatal labs and previous ultrasounds today.    MDM:  Diagnosis: Supervision of low risk pregnancy  Chronic HTN in pregnancy   Tests/Orders today: Orders Placed This Encounter   Procedures   • US Ob 14 + Weeks Single or First Gestation     Standing Status:   Future     Standing Expiration Date:   9/10/2019     Order Specific Question:   Reason for Exam:     Answer:   GROWTH        Topics discussed: kick counts and fetal movement  PIH precautions   labor signs and symptoms  TDAP vaccination   HTN - diagnosed at age 13, no meds, recommend twice weekly  testing starting at 32 weeks.  Growth scan in 2 weeks to be repeated every 3-4 weeks.    LARCs +  handouts    Tests next visit: U/S for EFW   Next visit: 2 week(s)     Taco Velez MD  Obstetrics and Gynecology  [facility]

## 2018-09-24 ENCOUNTER — ROUTINE PRENATAL (OUTPATIENT)
Dept: OBSTETRICS AND GYNECOLOGY | Facility: CLINIC | Age: 28
End: 2018-09-24

## 2018-09-24 VITALS — DIASTOLIC BLOOD PRESSURE: 80 MMHG | BODY MASS INDEX: 28.17 KG/M2 | SYSTOLIC BLOOD PRESSURE: 130 MMHG | WEIGHT: 159 LBS

## 2018-09-24 DIAGNOSIS — Z36.89 ENCOUNTER FOR ULTRASOUND TO CHECK FETAL GROWTH: Primary | ICD-10-CM

## 2018-09-24 DIAGNOSIS — Z34.93 THIRD TRIMESTER PREGNANCY: ICD-10-CM

## 2018-09-24 PROCEDURE — 0502F SUBSEQUENT PRENATAL CARE: CPT | Performed by: OBSTETRICS & GYNECOLOGY

## 2018-09-24 NOTE — PROGRESS NOTES
Chief Complaint   Patient presents with   • Routine Prenatal Visit     Growth Scan, Wants T dap vaccination, states she had some blood in urine on saturday        HPI:   , 31w0d gestation reports doing well  Normal BP's at home.    ROS:  See Prenatal Episode/Flowsheet  /80   Wt 72.1 kg (159 lb)   LMP 2018   BMI 28.17 kg/m²      EXAM:  EXTREMITIES:  No swelling-See Prenatal Episode/Flowsheet    ABDOMEN:  FHTs/Movement noted-See Prenatal Episode/Flowsheet    URINE GLUCOSE/PROTEIN:  See Prenatal Episode/Flowsheet    PELVIC EXAM:  See Prenatal Episode/Flowsheet  CV:  Lungs:    MDM:    Lab Results   Component Value Date    HGB 11.6 (L) 2018    RUBELLAABIGG 1.86 2018    HEPBSAG Negative 2018    ABO A 2018    RH Positive 2018    ABSCRN Negative 2018    [id number] Non Reactive 2018    URINECX Final report 2018       U/S: 47%, ROS 13.6, Vertex, Anterior placenta    1. IUP 31w0d  2. Routine care   3. Low sodium , doing well

## 2018-10-08 ENCOUNTER — HOSPITAL ENCOUNTER (OUTPATIENT)
Facility: HOSPITAL | Age: 28
Discharge: HOME | End: 2018-10-08
Attending: MIDWIFE | Admitting: MIDWIFE

## 2018-10-08 ENCOUNTER — ROUTINE PRENATAL (OUTPATIENT)
Dept: OBSTETRICS AND GYNECOLOGY | Facility: CLINIC | Age: 28
End: 2018-10-08

## 2018-10-08 VITALS
HEIGHT: 63 IN | BODY MASS INDEX: 28.7 KG/M2 | SYSTOLIC BLOOD PRESSURE: 118 MMHG | OXYGEN SATURATION: 96 % | TEMPERATURE: 99 F | HEART RATE: 84 BPM | RESPIRATION RATE: 16 BRPM | DIASTOLIC BLOOD PRESSURE: 71 MMHG | WEIGHT: 162 LBS

## 2018-10-08 VITALS — SYSTOLIC BLOOD PRESSURE: 136 MMHG | WEIGHT: 162 LBS | DIASTOLIC BLOOD PRESSURE: 78 MMHG | BODY MASS INDEX: 28.7 KG/M2

## 2018-10-08 DIAGNOSIS — Z34.93 NORMAL PREGNANCY, THIRD TRIMESTER: Primary | ICD-10-CM

## 2018-10-08 PROCEDURE — 0502F SUBSEQUENT PRENATAL CARE: CPT | Performed by: NURSE PRACTITIONER

## 2018-10-08 PROCEDURE — 59025 FETAL NON-STRESS TEST: CPT

## 2018-10-08 PROCEDURE — 59025 FETAL NON-STRESS TEST: CPT | Performed by: MIDWIFE

## 2018-10-08 PROCEDURE — G0008 ADMIN INFLUENZA VIRUS VAC: HCPCS | Performed by: MIDWIFE

## 2018-10-08 PROCEDURE — 25010000002 INFLUENZA VAC SUBUNIT QUAD 0.5 ML SUSPENSION PREFILLED SYRINGE: Performed by: MIDWIFE

## 2018-10-08 PROCEDURE — G0463 HOSPITAL OUTPT CLINIC VISIT: HCPCS

## 2018-10-08 PROCEDURE — 90661 CCIIV3 VAC ABX FR 0.5 ML IM: CPT | Performed by: MIDWIFE

## 2018-10-08 RX ADMIN — A/SINGAPORE/GP1908/2015 IVR-180 (H1N1) (AN A/MICHIGAN/45/2015-LIKE VIRUS), A/SINGAPORE/GP2050/2015 (H3N2) (AN A/HONG KONG/4801/2014 - LIKE VIRUS), B/UTAH/9/2014 (A B/PHUKET/3073/2013-LIKE VIRUS), B/HONG KONG/259/2010 (A B/BRISBANE/60/08-LIKE VIRUS) 0.5 ML: 15; 15; 15; 15 INJECTION, SUSPENSION INTRAMUSCULAR at 18:30

## 2018-10-08 NOTE — NON STRESS TEST
Karmen Tracey, a  at 33w0d with an CLARE of 2018, by Ultrasound, was seen at [facility] for a nonstress test.    Chief Complaint   Patient presents with   • Non-stress Test     Sent from office       Patient Active Problem List   Diagnosis   • Acne   • Anxiety disorder   • Benign ovarian cyst   • Elevated cortisol level (CMS/HCC)   • Elevated DHEA (CMS/HCC)   • Hypertension   • Palpitations   • Oral contraceptive use   • Personal history of leukemia   • Supervision of normal pregnancy       Start Time:   Stop Time:      Interpretation A  Nonstress Test Interpretation A: Reactive (10/08/18 1907 : Shelly Lopez, RN)

## 2018-10-08 NOTE — PROGRESS NOTES
Chief Complaint   Patient presents with   • Routine Prenatal Visit     no complaints         HPI  , 33w0d reports doing well.  BP's at home are  /67-75   No h/a's   -  Still working - lots of driving.  Considering maternity leave at 37 wks   Good FM       ROS:    GI: Nausea - No; Constipation - No; Diarrhea - No       Neuro: Headache - No; Visual change - No      EXAM:    /78   Wt 73.5 kg (162 lb)   LMP 2018   BMI 28.70 kg/m²      General Appearance: pleasant   Lungs: Breathing unlabored  Abdomen:  See flow sheet for Fundal ht, FM, FHT's  LE: Neg edema    MDM  Impression:  Problems/Risks: Pregnancy with Active Problems(s) &/or Complication(s)  HTN in pregnancy     Tests done today: With plans for  testing to be started at 32 wks gestation  Per Dr. Velez   NST today    Topics discussed: continue to note good FM  Flu vaccination  T-dap   PIH precautions  encouraged questions - call prn    Tests next visit: none     OB History      Para Term  AB Living    1 0 0 0 0 0    SAB TAB Ectopic Molar Multiple Live Births    0 0 0 0 0 0          Past Medical History:   Diagnosis Date   • Anxiety    • History of Papanicolaou smear of cervix     normal   • Oral contraceptive use    • Personal history of leukemia        Past Surgical History:   Procedure Laterality Date   • ADENOIDECTOMY     • BREAST LUMPECTOMY Left 2018    benign    • TONSILLECTOMY     • WISDOM TOOTH EXTRACTION         Family History   Problem Relation Age of Onset   • Hypertension Other    • Migraines Other    • Diabetes Other    • Arthritis Other    • Cancer Other    • Stroke Other    • Heart attack Other    • Breast cancer Maternal Grandmother        Social History     Social History   • Marital status:      Spouse name: N/A   • Number of children: N/A   • Years of education: N/A     Occupational History   • Not on file.     Social History Main Topics   • Smoking status: Never  Smoker   • Smokeless tobacco: Never Used   • Alcohol use No      Comment: Social alcohol use   • Drug use: No   • Sexual activity: Yes     Partners: Male     Birth control/ protection: None     Other Topics Concern   • Not on file     Social History Narrative   • No narrative on file

## 2018-10-11 ENCOUNTER — ROUTINE PRENATAL (OUTPATIENT)
Dept: OBSTETRICS AND GYNECOLOGY | Facility: CLINIC | Age: 28
End: 2018-10-11

## 2018-10-11 VITALS — BODY MASS INDEX: 28.52 KG/M2 | SYSTOLIC BLOOD PRESSURE: 142 MMHG | DIASTOLIC BLOOD PRESSURE: 90 MMHG | WEIGHT: 161 LBS

## 2018-10-11 DIAGNOSIS — O10.013 PRE-EXISTING ESSENTIAL HYPERTENSION DURING PREGNANCY IN THIRD TRIMESTER: Primary | ICD-10-CM

## 2018-10-11 DIAGNOSIS — O16.3 HYPERTENSION AFFECTING PREGNANCY IN THIRD TRIMESTER: Primary | ICD-10-CM

## 2018-10-11 PROCEDURE — 0502F SUBSEQUENT PRENATAL CARE: CPT | Performed by: OBSTETRICS & GYNECOLOGY

## 2018-10-11 NOTE — PROGRESS NOTES
Chief Complaint   Patient presents with   • Routine Prenatal Visit     BPP done, No Complaints        HPI:   , 33w3d gestation reports doing well    BPs at home a low 100s systolic 60s and 70s diastolic.    ROS:  See Prenatal Episode/Flowsheet  /90   Wt 73 kg (161 lb)   LMP 2018   BMI 28.52 kg/m²      EXAM:  EXTREMITIES:  No swelling-See Prenatal Episode/Flowsheet    ABDOMEN:  FHTs/Movement noted-See Prenatal Episode/Flowsheet    URINE GLUCOSE/PROTEIN:  See Prenatal Episode/Flowsheet    PELVIC EXAM:  See Prenatal Episode/Flowsheet  CV:  Lungs:    MDM:    Lab Results   Component Value Date    HGB 11.6 (L) 2018    RUBELLAABIGG 1.86 2018    HEPBSAG Negative 2018    ABO A 2018    RH Positive 2018    ABSCRN Negative 2018    [id number] Non Reactive 2018    URINECX Final report 2018       U/S: BPP 8/8, ROS 12, Vertex, Anterior placenta    1. IUP 33w3d  2. Routine care   3. CHTN: repeat /90--patient relates some White coat HTN today--BP's completely normal at home.    Off work now for CHTN in pregancy  4. Twice weekly monitoring.

## 2018-10-15 ENCOUNTER — HOSPITAL ENCOUNTER (OUTPATIENT)
Facility: HOSPITAL | Age: 28
Discharge: HOME | End: 2018-10-15
Attending: OBSTETRICS & GYNECOLOGY | Admitting: OBSTETRICS & GYNECOLOGY

## 2018-10-15 ENCOUNTER — HOSPITAL ENCOUNTER (OUTPATIENT)
Dept: LABOR AND DELIVERY | Facility: HOSPITAL | Age: 28
Discharge: HOME | End: 2018-10-15

## 2018-10-15 VITALS
HEIGHT: 63 IN | TEMPERATURE: 98.8 F | HEART RATE: 108 BPM | DIASTOLIC BLOOD PRESSURE: 88 MMHG | RESPIRATION RATE: 16 BRPM | BODY MASS INDEX: 28.62 KG/M2 | WEIGHT: 161.5 LBS | OXYGEN SATURATION: 100 % | SYSTOLIC BLOOD PRESSURE: 134 MMHG

## 2018-10-15 DIAGNOSIS — O10.013 PRE-EXISTING ESSENTIAL HYPERTENSION DURING PREGNANCY IN THIRD TRIMESTER: ICD-10-CM

## 2018-10-15 LAB
PROT 24H UR-MRATE: 374 MG/24HOURS (ref 42–225)
PROT UR-MCNC: 17 MG/DL (ref 0–11.9)

## 2018-10-15 PROCEDURE — 59025 FETAL NON-STRESS TEST: CPT | Performed by: OBSTETRICS & GYNECOLOGY

## 2018-10-15 PROCEDURE — 59025 FETAL NON-STRESS TEST: CPT

## 2018-10-15 PROCEDURE — G0463 HOSPITAL OUTPT CLINIC VISIT: HCPCS

## 2018-10-15 NOTE — NURSING NOTE
Dr. Velez called per RN for patient status.  MD is in a patient room and will return phone call to [facility]. - Re tremaine Stewart RN

## 2018-10-15 NOTE — NON STRESS TEST
Karmen Tracey, a  at 34w0d with an CLARE of 2018, by Ultrasound, was seen at [facility] for a nonstress test.    Chief Complaint   Patient presents with   • Non-stress Test     Blood Pressures       Patient Active Problem List   Diagnosis   • Acne   • Anxiety disorder   • Benign ovarian cyst   • Elevated cortisol level (CMS/HCC)   • Elevated DHEA (CMS/HCC)   • Hypertension   • Palpitations   • Oral contraceptive use   • Personal history of leukemia   • Supervision of normal pregnancy       Start Time: 1000  Stop Time: 1130    Interpretation A  Nonstress Test Interpretation A: Reactive (10/15/18 1153 : Mariaelena Stewart, RN)

## 2018-10-15 NOTE — NURSING NOTE
Dr. Velez returned phone call to .  Mariaelena Stewart RN, gave MD status update with patient.  New order received that patient may be discharged home.  Patient to follow up in office with next scheduled appointment. -   Mariaelena Stewart RN.

## 2018-10-18 ENCOUNTER — ROUTINE PRENATAL (OUTPATIENT)
Dept: OBSTETRICS AND GYNECOLOGY | Facility: CLINIC | Age: 28
End: 2018-10-18

## 2018-10-18 VITALS — SYSTOLIC BLOOD PRESSURE: 142 MMHG | WEIGHT: 162 LBS | DIASTOLIC BLOOD PRESSURE: 88 MMHG | BODY MASS INDEX: 28.7 KG/M2

## 2018-10-18 DIAGNOSIS — O09.93 ENCOUNTER FOR SUPERVISION OF HIGH RISK PREGNANCY IN THIRD TRIMESTER, ANTEPARTUM: ICD-10-CM

## 2018-10-18 DIAGNOSIS — O10.913 PRE-EXISTING HYPERTENSION AFFECTING PREGNANCY IN THIRD TRIMESTER: Primary | ICD-10-CM

## 2018-10-18 PROCEDURE — 0502F SUBSEQUENT PRENATAL CARE: CPT | Performed by: OBSTETRICS & GYNECOLOGY

## 2018-10-19 LAB
ALBUMIN/CREAT UR: <7.4 MG/G CREAT (ref 0–30)
ALT SERPL-CCNC: 20 IU/L (ref 0–32)
AST SERPL-CCNC: 22 IU/L (ref 0–40)
BASOPHILS # BLD AUTO: 0 X10E3/UL (ref 0–0.2)
BASOPHILS NFR BLD AUTO: 0 %
BUN SERPL-MCNC: 7 MG/DL (ref 6–20)
CREAT SERPL-MCNC: 0.67 MG/DL (ref 0.57–1)
CREAT UR-MCNC: 40.5 MG/DL
EOSINOPHIL # BLD AUTO: 0.1 X10E3/UL (ref 0–0.4)
EOSINOPHIL NFR BLD AUTO: 1 %
ERYTHROCYTE [DISTWIDTH] IN BLOOD BY AUTOMATED COUNT: 13.3 % (ref 12.3–15.4)
HCT VFR BLD AUTO: 36.5 % (ref 34–46.6)
HGB BLD-MCNC: 12.5 G/DL (ref 11.1–15.9)
IMM GRANULOCYTES # BLD: 0 X10E3/UL (ref 0–0.1)
IMM GRANULOCYTES NFR BLD: 0 %
LDH SERPL-CCNC: 185 IU/L (ref 119–226)
LYMPHOCYTES # BLD AUTO: 1.4 X10E3/UL (ref 0.7–3.1)
LYMPHOCYTES NFR BLD AUTO: 13 %
MCH RBC QN AUTO: 31.6 PG (ref 26.6–33)
MCHC RBC AUTO-ENTMCNC: 34.2 G/DL (ref 31.5–35.7)
MCV RBC AUTO: 92 FL (ref 79–97)
MICROALBUMIN UR-MCNC: <3 UG/ML
MONOCYTES # BLD AUTO: 0.5 X10E3/UL (ref 0.1–0.9)
MONOCYTES NFR BLD AUTO: 5 %
NEUTROPHILS # BLD AUTO: 8.5 X10E3/UL (ref 1.4–7)
NEUTROPHILS NFR BLD AUTO: 81 %
PLATELET # BLD AUTO: 286 X10E3/UL (ref 150–379)
RBC # BLD AUTO: 3.95 X10E6/UL (ref 3.77–5.28)
URATE SERPL-MCNC: 3 MG/DL (ref 2.5–7.1)
WBC # BLD AUTO: 10.6 X10E3/UL (ref 3.4–10.8)

## 2018-10-19 NOTE — PROGRESS NOTES
Chief Complaint   Patient presents with   • Routine Prenatal Visit     BPP TODAY, NO COMPLAINTS.        HPI: Karmen is a  currently at 34w4d who today reports the following:  Contractions - No; Leaking - No; Vaginal bleeding -  No; Heartburn - No.  Pt denies any PIH symptoms.  Pt checks bp at home and reports sbp 110's-120's/70's.  Pt is off work on modified bedrest.  Pt returned 24 hr urine for protein as noted; 374.  Pt had not had baseline done previously.    Pt reports her HTN has always been in dr's offices only.  Pt had not been on any medication prior to pregnancy; several years since patient had been on medication.  Pt had NST on Monday.  Pt reports good fetal movement.  ROS:   GI:   Nausea - No; Constipation - No; Diarrhea - No.   Neuro:  Headache - No; Visual disturbances - No.    EXAM:   Vitals:  See prenatal flowsheet as noted and reviewed   Abdomen:   See prenatal flowsheet as noted and reviewed   Pelvic:  See prenatal flowsheet as noted and reviewed   Urine:  See prenatal flowsheet as noted and reviewed     Lab Results   Component Value Date    ABO A 2018    RH Positive 2018    ABSCRN Negative 2018       MDM:  Impression: Supervision of high risk pregnancy  Chronic HTN in pregnancy   Proteinuria   Tests done today: PIH labs today, BPP today as noted    Topics discussed: labor signs and symptoms  PIH precautions   labor signs and symptoms  Pt to call for labs tomorrow  Continue bedrest   Tests next visit: 24 hr urine; NST on Monday and repeat BPP Thursday     This note was electronically signed.  Velma Sosa M.D.

## 2018-10-22 ENCOUNTER — HOSPITAL ENCOUNTER (OUTPATIENT)
Facility: HOSPITAL | Age: 28
Discharge: HOME | End: 2018-10-22
Attending: MIDWIFE | Admitting: MIDWIFE

## 2018-10-22 ENCOUNTER — HOSPITAL ENCOUNTER (OUTPATIENT)
Dept: LABOR AND DELIVERY | Facility: HOSPITAL | Age: 28
Discharge: HOME | End: 2018-10-22

## 2018-10-22 VITALS
BODY MASS INDEX: 28.7 KG/M2 | OXYGEN SATURATION: 99 % | HEART RATE: 85 BPM | HEIGHT: 63 IN | DIASTOLIC BLOOD PRESSURE: 84 MMHG | WEIGHT: 162 LBS | SYSTOLIC BLOOD PRESSURE: 116 MMHG | TEMPERATURE: 98.6 F | RESPIRATION RATE: 16 BRPM

## 2018-10-22 DIAGNOSIS — O10.913 PRE-EXISTING HYPERTENSION AFFECTING PREGNANCY IN THIRD TRIMESTER: ICD-10-CM

## 2018-10-22 LAB
PROT 24H UR-MRATE: 300 MG/24HOURS (ref 42–225)
PROT UR-MCNC: 12 MG/DL (ref 0–11.9)

## 2018-10-22 PROCEDURE — G0463 HOSPITAL OUTPT CLINIC VISIT: HCPCS

## 2018-10-22 PROCEDURE — 59025 FETAL NON-STRESS TEST: CPT | Performed by: MIDWIFE

## 2018-10-22 PROCEDURE — 59025 FETAL NON-STRESS TEST: CPT

## 2018-10-22 NOTE — NON STRESS TEST
Karmen Tracey, a  at 35w0d with an CLARE of 2018, by Ultrasound, was seen at [facility] for a nonstress test.    Chief Complaint   Patient presents with   • Non-stress Test     Blood Pressures       Patient Active Problem List   Diagnosis   • Acne   • Anxiety disorder   • Benign ovarian cyst   • Elevated cortisol level (CMS/HCC)   • Elevated DHEA (CMS/HCC)   • Hypertension   • Palpitations   • Oral contraceptive use   • Personal history of leukemia   • Supervision of normal pregnancy       Start Time: 1126  Stop Time: 1230    Interpretation A  Nonstress Test Interpretation A: Reactive (10/22/18 1305 : Mariaelena Stewart, RN)

## 2018-10-22 NOTE — NURSING NOTE
Take home instructions reviewed with patient and signed.  Patient made NST appointment for 10/29/18 @1000. - Mariaelena Stewart RN.

## 2018-10-25 ENCOUNTER — ROUTINE PRENATAL (OUTPATIENT)
Dept: OBSTETRICS AND GYNECOLOGY | Facility: CLINIC | Age: 28
End: 2018-10-25

## 2018-10-25 VITALS — DIASTOLIC BLOOD PRESSURE: 90 MMHG | BODY MASS INDEX: 28.34 KG/M2 | WEIGHT: 160 LBS | SYSTOLIC BLOOD PRESSURE: 142 MMHG

## 2018-10-25 DIAGNOSIS — O13.3 GESTATIONAL HYPERTENSION, THIRD TRIMESTER: ICD-10-CM

## 2018-10-25 DIAGNOSIS — Z36.85 ANTENATAL SCREENING FOR STREPTOCOCCUS B: Primary | ICD-10-CM

## 2018-10-25 PROCEDURE — 0502F SUBSEQUENT PRENATAL CARE: CPT | Performed by: OBSTETRICS & GYNECOLOGY

## 2018-10-25 NOTE — PROGRESS NOTES
Chief Complaint   Patient presents with   • Routine Prenatal Visit     GRowth Scan, GBS done, No Complaints, Good Fetal Movement         HPI:   , 35w3d gestation reports doing well  BPs at home one teens over the upper 60s/70s diastolic's    ROS:  See Prenatal Episode/Flowsheet  /90   Wt 72.6 kg (160 lb)   LMP 2018   BMI 28.34 kg/m²      EXAM:  EXTREMITIES:  No swelling-See Prenatal Episode/Flowsheet    ABDOMEN:  FHTs/Movement noted-See Prenatal Episode/Flowsheet    URINE GLUCOSE/PROTEIN:  See Prenatal Episode/Flowsheet    PELVIC EXAM:  See Prenatal Episode/Flowsheet  CV:  Lungs:    MDM:    Lab Results   Component Value Date    HGB 12.5 10/18/2018    RUBELLAABIGG 1.86 2018    HEPBSAG Negative 2018    ABO A 2018    RH Positive 2018    ABSCRN Negative 2018    [id number] Non Reactive 2018    URINECX Final report 2018       U/S: 47th percentile.  Amniotic fluid 12.66.  Vertex.  Anterior placenta.  Bio physical profile was 8 out of 8.    1. IUP 35w3d  2. Routine care  3. 24 urine protein 300 nd of this month. Normal U/S. NST Monday  4. BPP Thursday

## 2018-10-29 ENCOUNTER — HOSPITAL ENCOUNTER (OUTPATIENT)
Facility: HOSPITAL | Age: 28
Discharge: HOME | End: 2018-10-29
Attending: MIDWIFE | Admitting: MIDWIFE

## 2018-10-29 VITALS
SYSTOLIC BLOOD PRESSURE: 111 MMHG | OXYGEN SATURATION: 98 % | RESPIRATION RATE: 16 BRPM | DIASTOLIC BLOOD PRESSURE: 79 MMHG | HEART RATE: 81 BPM | BODY MASS INDEX: 28.35 KG/M2 | WEIGHT: 160 LBS | HEIGHT: 63 IN | TEMPERATURE: 98.4 F

## 2018-10-29 LAB
BILIRUB BLD-MCNC: NEGATIVE MG/DL
CLARITY, POC: CLEAR
COLOR UR: YELLOW
GLUCOSE UR STRIP-MCNC: NEGATIVE MG/DL
KETONES UR QL: NEGATIVE
LEUKOCYTE EST, POC: ABNORMAL
NITRITE UR-MCNC: NEGATIVE MG/ML
PH UR: 6.5 [PH] (ref 5–8)
PROT UR STRIP-MCNC: NEGATIVE MG/DL
RBC # UR STRIP: ABNORMAL /UL
SP GR UR: 1.01 (ref 1–1.03)
UROBILINOGEN UR QL: NORMAL

## 2018-10-29 PROCEDURE — 59025 FETAL NON-STRESS TEST: CPT

## 2018-10-29 PROCEDURE — 59025 FETAL NON-STRESS TEST: CPT | Performed by: MIDWIFE

## 2018-10-29 PROCEDURE — 81002 URINALYSIS NONAUTO W/O SCOPE: CPT | Performed by: MIDWIFE

## 2018-10-29 PROCEDURE — G0463 HOSPITAL OUTPT CLINIC VISIT: HCPCS

## 2018-10-30 LAB
CLINDAMYCIN ISLT KB: ABNORMAL
GP B STREP DNA SPEC QL NAA+PROBE: POSITIVE
ORGANISM ID: ABNORMAL

## 2018-11-01 ENCOUNTER — ROUTINE PRENATAL (OUTPATIENT)
Dept: OBSTETRICS AND GYNECOLOGY | Facility: CLINIC | Age: 28
End: 2018-11-01

## 2018-11-01 VITALS — SYSTOLIC BLOOD PRESSURE: 148 MMHG | DIASTOLIC BLOOD PRESSURE: 94 MMHG | WEIGHT: 163 LBS | BODY MASS INDEX: 28.87 KG/M2

## 2018-11-01 DIAGNOSIS — O13.3 GESTATIONAL HYPERTENSION, THIRD TRIMESTER: ICD-10-CM

## 2018-11-01 PROCEDURE — 0502F SUBSEQUENT PRENATAL CARE: CPT | Performed by: MIDWIFE

## 2018-11-01 NOTE — PROGRESS NOTES
Chief Complaint   Patient presents with   • Routine Prenatal Visit     BPP today, no complaints        HPI: Karmen is a  currently at 36w3d who today reports the following:  Contractions - No; Leaking - No; Vaginal bleeding -  No; Swelling of extremities - No; Baby is active - YES BP @ home has been good with diastolic in 80s. Denies epigastric pain    ROS:   GI:   Nausea - No; Constipation - No   Neuro:  Headache - No; Visual disturbances - No.    EXAM:   Vitals:  See prenatal flowsheet, /94 retake after rest 140/82, Wt +3#   Abdomen:   See prenatal flowsheet, soft, nontender   Pelvic:  See prenatal flowsheet   Urine:  See prenatal flowsheet    MDM:  Impression: Supervision of high risk pregnancy  Chronic HTN in pregnancy   Tests done today: BPP -    Topics discussed: kick counts and fetal movement  labor signs and symptoms  PIH precautions   Tests next visit: BPP  NST Monday   Next visit: 1 week     This note was electronically signed.  Nettie Jewell, OLVIN  2018

## 2018-11-05 ENCOUNTER — HOSPITAL ENCOUNTER (OUTPATIENT)
Dept: LABOR AND DELIVERY | Facility: HOSPITAL | Age: 28
Discharge: HOME | End: 2018-11-05

## 2018-11-05 ENCOUNTER — HOSPITAL ENCOUNTER (OUTPATIENT)
Facility: HOSPITAL | Age: 28
Discharge: HOME | End: 2018-11-05
Attending: MIDWIFE | Admitting: MIDWIFE

## 2018-11-05 VITALS
HEART RATE: 83 BPM | SYSTOLIC BLOOD PRESSURE: 123 MMHG | BODY MASS INDEX: 28.88 KG/M2 | WEIGHT: 163 LBS | TEMPERATURE: 98.4 F | DIASTOLIC BLOOD PRESSURE: 76 MMHG | HEIGHT: 63 IN | RESPIRATION RATE: 16 BRPM

## 2018-11-05 PROBLEM — B95.1 POSITIVE TESTING FOR GROUP B STREPTOCOCCUS: Status: ACTIVE | Noted: 2018-10-25

## 2018-11-05 LAB
BILIRUB BLD-MCNC: NEGATIVE MG/DL
CLARITY, POC: CLEAR
COLOR UR: YELLOW
GLUCOSE UR STRIP-MCNC: NEGATIVE MG/DL
KETONES UR QL: NEGATIVE
LEUKOCYTE EST, POC: NEGATIVE
NITRITE UR-MCNC: NEGATIVE MG/ML
PH UR: 5 [PH] (ref 5–8)
PROT UR STRIP-MCNC: ABNORMAL MG/DL
RBC # UR STRIP: ABNORMAL /UL
SP GR UR: 1.01 (ref 1–1.03)
UROBILINOGEN UR QL: NORMAL

## 2018-11-05 PROCEDURE — 81002 URINALYSIS NONAUTO W/O SCOPE: CPT | Performed by: MIDWIFE

## 2018-11-05 PROCEDURE — 59025 FETAL NON-STRESS TEST: CPT

## 2018-11-05 PROCEDURE — G0463 HOSPITAL OUTPT CLINIC VISIT: HCPCS

## 2018-11-05 PROCEDURE — 59025 FETAL NON-STRESS TEST: CPT | Performed by: MIDWIFE

## 2018-11-05 NOTE — NON STRESS TEST
Karmen Tracey, a  at 37w0d with an CLARE of 2018, by Ultrasound, was seen at [facility] for a nonstress test.    Chief Complaint   Patient presents with   • Non-stress Test     for increased blood pressure       Patient Active Problem List   Diagnosis   • Acne   • Anxiety disorder   • Benign ovarian cyst   • Elevated cortisol level (CMS/HCC)   • Elevated DHEA (CMS/HCC)   • Hypertension   • Palpitations   • Oral contraceptive use   • Personal history of leukemia   • Supervision of normal pregnancy       Start Time: 11:00  Stop Time: 11:52      Interpretation A  Nonstress Test Interpretation A: Reactive (18 1242 : Charlee Bailey, RN)

## 2018-11-08 ENCOUNTER — ROUTINE PRENATAL (OUTPATIENT)
Dept: OBSTETRICS AND GYNECOLOGY | Facility: CLINIC | Age: 28
End: 2018-11-08

## 2018-11-08 VITALS — WEIGHT: 163 LBS | BODY MASS INDEX: 28.87 KG/M2 | SYSTOLIC BLOOD PRESSURE: 138 MMHG | DIASTOLIC BLOOD PRESSURE: 80 MMHG

## 2018-11-08 DIAGNOSIS — O09.93 ENCOUNTER FOR SUPERVISION OF HIGH RISK PREGNANCY IN THIRD TRIMESTER, ANTEPARTUM: Primary | ICD-10-CM

## 2018-11-08 DIAGNOSIS — O10.913 PRE-EXISTING HYPERTENSION AFFECTING PREGNANCY IN THIRD TRIMESTER: ICD-10-CM

## 2018-11-08 PROCEDURE — 0502F SUBSEQUENT PRENATAL CARE: CPT | Performed by: OBSTETRICS & GYNECOLOGY

## 2018-11-09 NOTE — PROGRESS NOTES
Chief Complaint   Patient presents with   • Routine Prenatal Visit     BPP TODAY, PATIENT COMPLAINS OF CRAMPING AT NIGHT.        HPI: Karmen is a  currently at 37w4d who today reports the following:  Contractions - No; Leaking - No; Vaginal bleeding -  No; Heartburn - No.  Pt reports good fetal movement.  Pt continues to monitor bps at home; reports sbp 110's and dbp 70's.  Pt denies any PIH symptoms.  Pt does have some cramping at night but not severe or regular.  ROS:   GI:   Nausea - No; Constipation - No; Diarrhea - No.   Neuro:  Headache - No; Visual disturbances - No.    EXAM:   Vitals:  See prenatal flowsheet as noted and reviewed   Abdomen:   See prenatal flowsheet as noted and reviewed   Pelvic:  See prenatal flowsheet as noted and reviewed   Urine:  See prenatal flowsheet as noted and reviewed     Lab Results   Component Value Date    ABO A 2018    RH Positive 2018    ABSCRN Negative 2018       MDM:  Impression: Supervision of high risk pregnancy  Chronic HTN in pregnancy   Tests done today: BPP -    Discussed the ?fetal hydronephrosis; pt informed this is normal for 3rd trimester of pregnancy; greater than or equal to 7 mm is considered abnormal for third trimester   Topics discussed: kick counts and fetal movement  labor signs and symptoms  PIH precautions   Tests next visit: NST on Monday  F/U 1 week     This note was electronically signed.  Velma Sosa M.D.

## 2018-11-12 ENCOUNTER — HOSPITAL ENCOUNTER (OUTPATIENT)
Facility: HOSPITAL | Age: 28
Discharge: HOME | End: 2018-11-12
Attending: MIDWIFE | Admitting: MIDWIFE

## 2018-11-12 ENCOUNTER — HOSPITAL ENCOUNTER (OUTPATIENT)
Dept: LABOR AND DELIVERY | Facility: HOSPITAL | Age: 28
Discharge: HOME | End: 2018-11-12

## 2018-11-12 VITALS
WEIGHT: 163 LBS | BODY MASS INDEX: 28.88 KG/M2 | DIASTOLIC BLOOD PRESSURE: 92 MMHG | OXYGEN SATURATION: 99 % | RESPIRATION RATE: 18 BRPM | HEIGHT: 63 IN | HEART RATE: 98 BPM | SYSTOLIC BLOOD PRESSURE: 126 MMHG | TEMPERATURE: 98.5 F

## 2018-11-12 LAB
BILIRUB BLD-MCNC: NEGATIVE MG/DL
CLARITY, POC: CLEAR
COLOR UR: YELLOW
GLUCOSE UR STRIP-MCNC: NEGATIVE MG/DL
KETONES UR QL: NEGATIVE
LEUKOCYTE EST, POC: NEGATIVE
NITRITE UR-MCNC: NEGATIVE MG/ML
PH UR: 6.5 [PH] (ref 5–8)
PROT UR STRIP-MCNC: NEGATIVE MG/DL
RBC # UR STRIP: NEGATIVE /UL
SP GR UR: 1.01 (ref 1–1.03)
UROBILINOGEN UR QL: NORMAL

## 2018-11-12 PROCEDURE — 59025 FETAL NON-STRESS TEST: CPT | Performed by: MIDWIFE

## 2018-11-12 PROCEDURE — 59025 FETAL NON-STRESS TEST: CPT

## 2018-11-12 PROCEDURE — 81002 URINALYSIS NONAUTO W/O SCOPE: CPT | Performed by: MIDWIFE

## 2018-11-12 PROCEDURE — G0463 HOSPITAL OUTPT CLINIC VISIT: HCPCS

## 2018-11-12 NOTE — NON STRESS TEST
Karmen Tracey, a  at 38w0d with an CLARE of 2018, by Ultrasound, was seen at [facility] for a nonstress test.    Chief Complaint   Patient presents with   • Non-stress Test     GHTN       Patient Active Problem List   Diagnosis   • Acne   • Anxiety disorder   • Benign ovarian cyst   • Elevated cortisol level (CMS/HCC)   • Elevated DHEA (CMS/HCC)   • Hypertension   • Palpitations   • Oral contraceptive use   • Personal history of leukemia   • Supervision of normal pregnancy   • Positive testing for group B Streptococcus       Start Time:   Stop Time:    Interpretation A  Nonstress Test Interpretation A: Reactive (18 1219 : Sofía Shah, RN)  Comments A: Positive fetal movement (18 1219 : Sofía Shah, RN)

## 2018-11-12 NOTE — DISCHARGE INSTRUCTIONS
Patient to keep regular scheduled appointment at OB office.  Drink plenty of water 6-8 glasses per day.  Return to [facility] if any problems.

## 2018-11-15 ENCOUNTER — HOSPITAL ENCOUNTER (INPATIENT)
Facility: HOSPITAL | Age: 28
LOS: 3 days | Discharge: HOME | End: 2018-11-18
Attending: NURSE PRACTITIONER | Admitting: NURSE PRACTITIONER

## 2018-11-15 ENCOUNTER — ROUTINE PRENATAL (OUTPATIENT)
Dept: OBSTETRICS AND GYNECOLOGY | Facility: CLINIC | Age: 28
End: 2018-11-15

## 2018-11-15 VITALS — WEIGHT: 163 LBS | SYSTOLIC BLOOD PRESSURE: 144 MMHG | DIASTOLIC BLOOD PRESSURE: 100 MMHG | BODY MASS INDEX: 28.87 KG/M2

## 2018-11-15 DIAGNOSIS — O16.3 HYPERTENSION DURING PREGNANCY IN THIRD TRIMESTER, UNSPECIFIED HYPERTENSION IN PREGNANCY TYPE: Primary | ICD-10-CM

## 2018-11-15 LAB
ABO GROUP BLD: NORMAL
ABO GROUP BLD: NORMAL
ALBUMIN SERPL-MCNC: 4.1 G/DL (ref 3.5–5)
ALBUMIN/GLOB SERPL: 1.3 G/DL (ref 1–2)
ALP SERPL-CCNC: 195 U/L (ref 38–126)
ALT SERPL W P-5'-P-CCNC: 40 U/L (ref 13–69)
ANION GAP SERPL CALCULATED.3IONS-SCNC: 11.2 MMOL/L (ref 10–20)
AST SERPL-CCNC: 28 U/L (ref 15–46)
BASOPHILS # BLD AUTO: 0.04 10*3/MM3 (ref 0–0.2)
BASOPHILS NFR BLD AUTO: 0.3 % (ref 0–2.5)
BILIRUB BLD-MCNC: NEGATIVE MG/DL
BILIRUB SERPL-MCNC: 0.4 MG/DL (ref 0.2–1.3)
BLD GP AB SCN SERPL QL: NEGATIVE
BUN BLD-MCNC: 11 MG/DL (ref 7–20)
BUN/CREAT SERPL: 18.3 (ref 7.1–23.5)
CALCIUM SPEC-SCNC: 9.6 MG/DL (ref 8.4–10.2)
CHLORIDE SERPL-SCNC: 104 MMOL/L (ref 98–107)
CLARITY, POC: CLEAR
CO2 SERPL-SCNC: 24 MMOL/L (ref 26–30)
COLOR UR: YELLOW
CREAT BLD-MCNC: 0.6 MG/DL (ref 0.6–1.3)
DEPRECATED RDW RBC AUTO: 44.8 FL (ref 37–54)
EOSINOPHIL # BLD AUTO: 0.07 10*3/MM3 (ref 0–0.7)
EOSINOPHIL NFR BLD AUTO: 0.6 % (ref 0–7)
ERYTHROCYTE [DISTWIDTH] IN BLOOD BY AUTOMATED COUNT: 12.9 % (ref 11.5–14.5)
GFR SERPL CREATININE-BSD FRML MDRD: 120 ML/MIN/1.73
GLOBULIN UR ELPH-MCNC: 3.2 GM/DL
GLUCOSE BLD-MCNC: 81 MG/DL (ref 74–98)
GLUCOSE UR STRIP-MCNC: NEGATIVE MG/DL
HCT VFR BLD AUTO: 41.6 % (ref 37–47)
HGB BLD-MCNC: 13.8 G/DL (ref 12–16)
IMM GRANULOCYTES # BLD: 0.05 10*3/MM3 (ref 0–0.06)
IMM GRANULOCYTES NFR BLD: 0.4 % (ref 0–0.6)
KETONES UR QL: NEGATIVE
LEUKOCYTE EST, POC: NEGATIVE
LYMPHOCYTES # BLD AUTO: 1.5 10*3/MM3 (ref 0.6–3.4)
LYMPHOCYTES NFR BLD AUTO: 12.7 % (ref 10–50)
MCH RBC QN AUTO: 31.4 PG (ref 27–31)
MCHC RBC AUTO-ENTMCNC: 33.2 G/DL (ref 30–37)
MCV RBC AUTO: 94.8 FL (ref 81–99)
MONOCYTES # BLD AUTO: 0.56 10*3/MM3 (ref 0–0.9)
MONOCYTES NFR BLD AUTO: 4.7 % (ref 0–12)
NEUTROPHILS # BLD AUTO: 9.6 10*3/MM3 (ref 2–6.9)
NEUTROPHILS NFR BLD AUTO: 81.3 % (ref 37–80)
NITRITE UR-MCNC: NEGATIVE MG/ML
NRBC BLD MANUAL-RTO: 0 /100 WBC (ref 0–0)
PH UR: 6.5 [PH] (ref 5–8)
PLATELET # BLD AUTO: 280 10*3/MM3 (ref 130–400)
PMV BLD AUTO: 9.3 FL (ref 6–12)
POTASSIUM BLD-SCNC: 4.2 MMOL/L (ref 3.5–5.1)
PROT SERPL-MCNC: 7.3 G/DL (ref 6.3–8.2)
PROT UR STRIP-MCNC: NEGATIVE MG/DL
RBC # BLD AUTO: 4.39 10*6/MM3 (ref 4.2–5.4)
RBC # UR STRIP: NEGATIVE /UL
RH BLD: POSITIVE
RH BLD: POSITIVE
SODIUM BLD-SCNC: 135 MMOL/L (ref 137–145)
SP GR UR: 1 (ref 1–1.03)
T&S EXPIRATION DATE: NORMAL
UROBILINOGEN UR QL: NORMAL
WBC NRBC COR # BLD: 11.82 10*3/MM3 (ref 4.8–10.8)

## 2018-11-15 PROCEDURE — 80053 COMPREHEN METABOLIC PANEL: CPT | Performed by: NURSE PRACTITIONER

## 2018-11-15 PROCEDURE — 59025 FETAL NON-STRESS TEST: CPT

## 2018-11-15 PROCEDURE — G0463 HOSPITAL OUTPT CLINIC VISIT: HCPCS

## 2018-11-15 PROCEDURE — 86901 BLOOD TYPING SEROLOGIC RH(D): CPT

## 2018-11-15 PROCEDURE — 86901 BLOOD TYPING SEROLOGIC RH(D): CPT | Performed by: NURSE PRACTITIONER

## 2018-11-15 PROCEDURE — 86900 BLOOD TYPING SEROLOGIC ABO: CPT

## 2018-11-15 PROCEDURE — 25010000002 VANCOMYCIN 1 G RECONSTITUTED SOLUTION 1 EACH VIAL: Performed by: NURSE PRACTITIONER

## 2018-11-15 PROCEDURE — 0502F SUBSEQUENT PRENATAL CARE: CPT | Performed by: OBSTETRICS & GYNECOLOGY

## 2018-11-15 PROCEDURE — 81002 URINALYSIS NONAUTO W/O SCOPE: CPT | Performed by: NURSE PRACTITIONER

## 2018-11-15 PROCEDURE — 85025 COMPLETE CBC W/AUTO DIFF WBC: CPT | Performed by: NURSE PRACTITIONER

## 2018-11-15 PROCEDURE — 59025 FETAL NON-STRESS TEST: CPT | Performed by: NURSE PRACTITIONER

## 2018-11-15 PROCEDURE — 36415 COLL VENOUS BLD VENIPUNCTURE: CPT | Performed by: NURSE PRACTITIONER

## 2018-11-15 PROCEDURE — 3E033VJ INTRODUCTION OF OTHER HORMONE INTO PERIPHERAL VEIN, PERCUTANEOUS APPROACH: ICD-10-PCS | Performed by: NURSE PRACTITIONER

## 2018-11-15 PROCEDURE — 86850 RBC ANTIBODY SCREEN: CPT | Performed by: NURSE PRACTITIONER

## 2018-11-15 PROCEDURE — 86900 BLOOD TYPING SEROLOGIC ABO: CPT | Performed by: NURSE PRACTITIONER

## 2018-11-15 RX ORDER — SODIUM CHLORIDE, SODIUM LACTATE, POTASSIUM CHLORIDE, CALCIUM CHLORIDE 600; 310; 30; 20 MG/100ML; MG/100ML; MG/100ML; MG/100ML
125 INJECTION, SOLUTION INTRAVENOUS CONTINUOUS
Status: DISCONTINUED | OUTPATIENT
Start: 2018-11-15 | End: 2018-11-16

## 2018-11-15 RX ORDER — SODIUM CHLORIDE 0.9 % (FLUSH) 0.9 %
3 SYRINGE (ML) INJECTION EVERY 12 HOURS SCHEDULED
Status: DISCONTINUED | OUTPATIENT
Start: 2018-11-15 | End: 2018-11-16 | Stop reason: HOSPADM

## 2018-11-15 RX ORDER — PROMETHAZINE HYDROCHLORIDE 25 MG/ML
25 INJECTION, SOLUTION INTRAMUSCULAR; INTRAVENOUS EVERY 4 HOURS PRN
Status: DISCONTINUED | OUTPATIENT
Start: 2018-11-15 | End: 2018-11-16 | Stop reason: HOSPADM

## 2018-11-15 RX ORDER — MORPHINE SULFATE 2 MG/ML
4 INJECTION, SOLUTION INTRAMUSCULAR; INTRAVENOUS EVERY 4 HOURS PRN
Status: CANCELLED | OUTPATIENT
Start: 2018-11-15 | End: 2018-11-25

## 2018-11-15 RX ORDER — METHYLERGONOVINE MALEATE 0.2 MG/ML
200 INJECTION INTRAVENOUS ONCE AS NEEDED
Status: CANCELLED | OUTPATIENT
Start: 2018-11-15 | End: 2018-11-16

## 2018-11-15 RX ORDER — LIDOCAINE HYDROCHLORIDE 10 MG/ML
5 INJECTION, SOLUTION EPIDURAL; INFILTRATION; INTRACAUDAL; PERINEURAL AS NEEDED
Status: DISCONTINUED | OUTPATIENT
Start: 2018-11-15 | End: 2018-11-16 | Stop reason: HOSPADM

## 2018-11-15 RX ORDER — PROMETHAZINE HYDROCHLORIDE 12.5 MG/1
12.5 SUPPOSITORY RECTAL EVERY 4 HOURS PRN
Status: DISCONTINUED | OUTPATIENT
Start: 2018-11-15 | End: 2018-11-16 | Stop reason: HOSPADM

## 2018-11-15 RX ORDER — MORPHINE SULFATE 2 MG/ML
4 INJECTION, SOLUTION INTRAMUSCULAR; INTRAVENOUS EVERY 4 HOURS PRN
Status: DISCONTINUED | OUTPATIENT
Start: 2018-11-15 | End: 2018-11-16 | Stop reason: HOSPADM

## 2018-11-15 RX ORDER — HYDROCODONE BITARTRATE AND ACETAMINOPHEN 5; 325 MG/1; MG/1
1 TABLET ORAL EVERY 4 HOURS PRN
Status: CANCELLED | OUTPATIENT
Start: 2018-11-15 | End: 2018-11-25

## 2018-11-15 RX ORDER — ZOLPIDEM TARTRATE 5 MG/1
5 TABLET ORAL NIGHTLY PRN
Status: CANCELLED | OUTPATIENT
Start: 2018-11-15 | End: 2018-11-25

## 2018-11-15 RX ORDER — ONDANSETRON 4 MG/1
4 TABLET, ORALLY DISINTEGRATING ORAL EVERY 6 HOURS PRN
Status: CANCELLED | OUTPATIENT
Start: 2018-11-15

## 2018-11-15 RX ORDER — PROMETHAZINE HYDROCHLORIDE 12.5 MG/1
12.5 TABLET ORAL EVERY 6 HOURS PRN
Status: DISCONTINUED | OUTPATIENT
Start: 2018-11-15 | End: 2018-11-16 | Stop reason: HOSPADM

## 2018-11-15 RX ORDER — MISOPROSTOL 200 UG/1
800 TABLET ORAL AS NEEDED
Status: CANCELLED | OUTPATIENT
Start: 2018-11-15 | End: 2018-11-16

## 2018-11-15 RX ORDER — MORPHINE SULFATE 2 MG/ML
6 INJECTION, SOLUTION INTRAMUSCULAR; INTRAVENOUS EVERY 4 HOURS PRN
Status: DISCONTINUED | OUTPATIENT
Start: 2018-11-15 | End: 2018-11-16 | Stop reason: HOSPADM

## 2018-11-15 RX ORDER — PROMETHAZINE HYDROCHLORIDE 25 MG/ML
12.5 INJECTION, SOLUTION INTRAMUSCULAR; INTRAVENOUS EVERY 4 HOURS PRN
Status: DISCONTINUED | OUTPATIENT
Start: 2018-11-15 | End: 2018-11-16 | Stop reason: HOSPADM

## 2018-11-15 RX ORDER — ZOLPIDEM TARTRATE 5 MG/1
5 TABLET ORAL NIGHTLY PRN
Status: DISCONTINUED | OUTPATIENT
Start: 2018-11-15 | End: 2018-11-16 | Stop reason: HOSPADM

## 2018-11-15 RX ORDER — ACETAMINOPHEN 325 MG/1
650 TABLET ORAL EVERY 4 HOURS PRN
Status: CANCELLED | OUTPATIENT
Start: 2018-11-15

## 2018-11-15 RX ORDER — IBUPROFEN 600 MG/1
600 TABLET ORAL EVERY 6 HOURS PRN
Status: CANCELLED | OUTPATIENT
Start: 2018-11-15

## 2018-11-15 RX ORDER — CALCIUM CARBONATE 200(500)MG
2 TABLET,CHEWABLE ORAL 3 TIMES DAILY PRN
Status: CANCELLED | OUTPATIENT
Start: 2018-11-15

## 2018-11-15 RX ORDER — PROMETHAZINE HYDROCHLORIDE 12.5 MG/1
12.5 SUPPOSITORY RECTAL EVERY 6 HOURS PRN
Status: DISCONTINUED | OUTPATIENT
Start: 2018-11-15 | End: 2018-11-16 | Stop reason: HOSPADM

## 2018-11-15 RX ORDER — MORPHINE SULFATE 2 MG/ML
2 INJECTION, SOLUTION INTRAMUSCULAR; INTRAVENOUS
Status: CANCELLED | OUTPATIENT
Start: 2018-11-15 | End: 2018-11-25

## 2018-11-15 RX ORDER — SODIUM CHLORIDE 0.9 % (FLUSH) 0.9 %
1-10 SYRINGE (ML) INJECTION AS NEEDED
Status: DISCONTINUED | OUTPATIENT
Start: 2018-11-15 | End: 2018-11-16 | Stop reason: HOSPADM

## 2018-11-15 RX ORDER — CARBOPROST TROMETHAMINE 250 UG/ML
250 INJECTION, SOLUTION INTRAMUSCULAR AS NEEDED
Status: CANCELLED | OUTPATIENT
Start: 2018-11-15 | End: 2018-11-16

## 2018-11-15 RX ORDER — PROMETHAZINE HYDROCHLORIDE 12.5 MG/1
12.5 TABLET ORAL EVERY 4 HOURS PRN
Status: DISCONTINUED | OUTPATIENT
Start: 2018-11-15 | End: 2018-11-16 | Stop reason: HOSPADM

## 2018-11-15 RX ORDER — ONDANSETRON 4 MG/1
4 TABLET, FILM COATED ORAL EVERY 6 HOURS PRN
Status: CANCELLED | OUTPATIENT
Start: 2018-11-15

## 2018-11-15 RX ORDER — SODIUM CHLORIDE 0.9 % (FLUSH) 0.9 %
1-10 SYRINGE (ML) INJECTION AS NEEDED
Status: DISCONTINUED | OUTPATIENT
Start: 2018-11-15 | End: 2018-11-15

## 2018-11-15 RX ORDER — HYDROCODONE BITARTRATE AND ACETAMINOPHEN 5; 325 MG/1; MG/1
2 TABLET ORAL EVERY 4 HOURS PRN
Status: CANCELLED | OUTPATIENT
Start: 2018-11-15 | End: 2018-11-25

## 2018-11-15 RX ORDER — ONDANSETRON 2 MG/ML
4 INJECTION INTRAMUSCULAR; INTRAVENOUS EVERY 6 HOURS PRN
Status: CANCELLED | OUTPATIENT
Start: 2018-11-15

## 2018-11-15 RX ADMIN — ZOLPIDEM TARTRATE 5 MG: 5 TABLET ORAL at 22:46

## 2018-11-15 RX ADMIN — SODIUM CHLORIDE, POTASSIUM CHLORIDE, SODIUM LACTATE AND CALCIUM CHLORIDE 125 ML/HR: 600; 310; 30; 20 INJECTION, SOLUTION INTRAVENOUS at 20:30

## 2018-11-15 RX ADMIN — VANCOMYCIN HYDROCHLORIDE 1 G: 1 INJECTION, POWDER, LYOPHILIZED, FOR SOLUTION INTRAVENOUS at 21:59

## 2018-11-15 NOTE — PROGRESS NOTES
CC: HTN in pregnancy     HPI:   , 38w3d gestation reports carmping since last night, felling overall poorly, urinary urgency,     ROS:  See Prenatal Episode/Flowsheet  /100   Wt 73.9 kg (163 lb)   LMP 2018   BMI 28.87 kg/m²      EXAM:  EXTREMITIES:  No swelling-See Prenatal Episode/Flowsheet    ABDOMEN:  FHTs/Movement noted-See Prenatal Episode/Flowsheet    URINE GLUCOSE/PROTEIN:  See Prenatal Episode/Flowsheet    PELVIC EXAM:  See Prenatal Episode/Flowsheet  CV:  Lungs:    MDM:    Lab Results   Component Value Date    HGB 12.5 10/18/2018    RUBELLAABIGG 1.86 2018    HEPBSAG Negative 2018    ABO A 2018    RH Positive 2018    ABSCRN Negative 2018    [id number] Non Reactive 2018    STREPGPB Positive (A) 10/25/2018    URINECX Final report 2018       U/S:    1. IUP 38w3d  2. Routine care   3. Cervix 1-2/80/head very low  4. CHTN--to L&D for serial BP's

## 2018-11-16 ENCOUNTER — ANESTHESIA EVENT (OUTPATIENT)
Dept: LABOR AND DELIVERY | Facility: HOSPITAL | Age: 28
End: 2018-11-16

## 2018-11-16 ENCOUNTER — ANESTHESIA (OUTPATIENT)
Dept: LABOR AND DELIVERY | Facility: HOSPITAL | Age: 28
End: 2018-11-16

## 2018-11-16 PROBLEM — Z34.90 PREGNANCY: Status: ACTIVE | Noted: 2018-11-16

## 2018-11-16 PROCEDURE — G0463 HOSPITAL OUTPT CLINIC VISIT: HCPCS

## 2018-11-16 PROCEDURE — C1755 CATHETER, INTRASPINAL: HCPCS | Performed by: NURSE ANESTHETIST, CERTIFIED REGISTERED

## 2018-11-16 PROCEDURE — 59400 OBSTETRICAL CARE: CPT | Performed by: NURSE PRACTITIONER

## 2018-11-16 PROCEDURE — 25010000002 VANCOMYCIN 1 G RECONSTITUTED SOLUTION 1 EACH VIAL: Performed by: NURSE PRACTITIONER

## 2018-11-16 PROCEDURE — 51702 INSERT TEMP BLADDER CATH: CPT

## 2018-11-16 PROCEDURE — 59025 FETAL NON-STRESS TEST: CPT

## 2018-11-16 PROCEDURE — 0KQM0ZZ REPAIR PERINEUM MUSCLE, OPEN APPROACH: ICD-10-PCS | Performed by: NURSE PRACTITIONER

## 2018-11-16 RX ORDER — ONDANSETRON 2 MG/ML
4 INJECTION INTRAMUSCULAR; INTRAVENOUS ONCE AS NEEDED
Status: DISCONTINUED | OUTPATIENT
Start: 2018-11-16 | End: 2018-11-16 | Stop reason: HOSPADM

## 2018-11-16 RX ORDER — ACETAMINOPHEN 325 MG/1
650 TABLET ORAL EVERY 4 HOURS PRN
Status: DISCONTINUED | OUTPATIENT
Start: 2018-11-16 | End: 2018-11-16 | Stop reason: HOSPADM

## 2018-11-16 RX ORDER — HYDROCODONE BITARTRATE AND ACETAMINOPHEN 5; 325 MG/1; MG/1
1 TABLET ORAL EVERY 4 HOURS PRN
Status: DISCONTINUED | OUTPATIENT
Start: 2018-11-16 | End: 2018-11-16 | Stop reason: HOSPADM

## 2018-11-16 RX ORDER — PROMETHAZINE HYDROCHLORIDE 25 MG/1
25 TABLET ORAL EVERY 6 HOURS PRN
Status: DISCONTINUED | OUTPATIENT
Start: 2018-11-16 | End: 2018-11-18 | Stop reason: HOSPADM

## 2018-11-16 RX ORDER — SODIUM CHLORIDE 0.9 % (FLUSH) 0.9 %
1-10 SYRINGE (ML) INJECTION AS NEEDED
Status: DISCONTINUED | OUTPATIENT
Start: 2018-11-16 | End: 2018-11-18 | Stop reason: HOSPADM

## 2018-11-16 RX ORDER — MORPHINE SULFATE 2 MG/ML
4 INJECTION, SOLUTION INTRAMUSCULAR; INTRAVENOUS EVERY 4 HOURS PRN
Status: DISCONTINUED | OUTPATIENT
Start: 2018-11-16 | End: 2018-11-16 | Stop reason: SDUPTHER

## 2018-11-16 RX ORDER — TRISODIUM CITRATE DIHYDRATE AND CITRIC ACID MONOHYDRATE 500; 334 MG/5ML; MG/5ML
30 SOLUTION ORAL ONCE
Status: DISCONTINUED | OUTPATIENT
Start: 2018-11-16 | End: 2018-11-16 | Stop reason: HOSPADM

## 2018-11-16 RX ORDER — PRENATAL VIT/IRON FUM/FOLIC AC 27MG-0.8MG
1 TABLET ORAL DAILY
Status: DISCONTINUED | OUTPATIENT
Start: 2018-11-16 | End: 2018-11-18 | Stop reason: HOSPADM

## 2018-11-16 RX ORDER — LANOLIN
CREAM (GRAM) TOPICAL
Status: DISCONTINUED | OUTPATIENT
Start: 2018-11-16 | End: 2018-11-18 | Stop reason: HOSPADM

## 2018-11-16 RX ORDER — DOCUSATE SODIUM 100 MG/1
100 CAPSULE, LIQUID FILLED ORAL 2 TIMES DAILY PRN
Status: DISCONTINUED | OUTPATIENT
Start: 2018-11-16 | End: 2018-11-18 | Stop reason: HOSPADM

## 2018-11-16 RX ORDER — ACETAMINOPHEN 325 MG/1
650 TABLET ORAL EVERY 4 HOURS PRN
Status: DISCONTINUED | OUTPATIENT
Start: 2018-11-16 | End: 2018-11-18 | Stop reason: HOSPADM

## 2018-11-16 RX ORDER — IBUPROFEN 600 MG/1
600 TABLET ORAL EVERY 6 HOURS PRN
Status: DISCONTINUED | OUTPATIENT
Start: 2018-11-16 | End: 2018-11-18 | Stop reason: HOSPADM

## 2018-11-16 RX ORDER — BISACODYL 10 MG
10 SUPPOSITORY, RECTAL RECTAL DAILY PRN
Status: DISCONTINUED | OUTPATIENT
Start: 2018-11-17 | End: 2018-11-18 | Stop reason: HOSPADM

## 2018-11-16 RX ORDER — ZOLPIDEM TARTRATE 5 MG/1
5 TABLET ORAL NIGHTLY PRN
Status: DISCONTINUED | OUTPATIENT
Start: 2018-11-16 | End: 2018-11-16 | Stop reason: HOSPADM

## 2018-11-16 RX ORDER — MORPHINE SULFATE 2 MG/ML
6 INJECTION, SOLUTION INTRAMUSCULAR; INTRAVENOUS EVERY 4 HOURS PRN
Status: DISCONTINUED | OUTPATIENT
Start: 2018-11-16 | End: 2018-11-16 | Stop reason: SDUPTHER

## 2018-11-16 RX ORDER — METHYLERGONOVINE MALEATE 0.2 MG/ML
200 INJECTION INTRAVENOUS ONCE AS NEEDED
Status: DISCONTINUED | OUTPATIENT
Start: 2018-11-16 | End: 2018-11-16 | Stop reason: HOSPADM

## 2018-11-16 RX ORDER — MORPHINE SULFATE 2 MG/ML
2 INJECTION, SOLUTION INTRAMUSCULAR; INTRAVENOUS
Status: DISCONTINUED | OUTPATIENT
Start: 2018-11-16 | End: 2018-11-16 | Stop reason: HOSPADM

## 2018-11-16 RX ORDER — CALCIUM CARBONATE 200(500)MG
2 TABLET,CHEWABLE ORAL 3 TIMES DAILY PRN
Status: DISCONTINUED | OUTPATIENT
Start: 2018-11-16 | End: 2018-11-16 | Stop reason: HOSPADM

## 2018-11-16 RX ORDER — MISOPROSTOL 200 UG/1
800 TABLET ORAL ONCE AS NEEDED
Status: DISCONTINUED | OUTPATIENT
Start: 2018-11-16 | End: 2018-11-16 | Stop reason: HOSPADM

## 2018-11-16 RX ORDER — LIDOCAINE HYDROCHLORIDE 10 MG/ML
20 INJECTION, SOLUTION INFILTRATION; PERINEURAL ONCE
Status: COMPLETED | OUTPATIENT
Start: 2018-11-16 | End: 2018-11-16

## 2018-11-16 RX ORDER — BUPIVACAINE HYDROCHLORIDE 2.5 MG/ML
INJECTION, SOLUTION EPIDURAL; INFILTRATION; INTRACAUDAL AS NEEDED
Status: DISCONTINUED | OUTPATIENT
Start: 2018-11-16 | End: 2018-11-16 | Stop reason: SURG

## 2018-11-16 RX ORDER — ONDANSETRON 4 MG/1
4 TABLET, FILM COATED ORAL EVERY 6 HOURS PRN
Status: DISCONTINUED | OUTPATIENT
Start: 2018-11-16 | End: 2018-11-16 | Stop reason: HOSPADM

## 2018-11-16 RX ORDER — PROMETHAZINE HYDROCHLORIDE 12.5 MG/1
12.5 TABLET ORAL EVERY 6 HOURS PRN
Status: DISCONTINUED | OUTPATIENT
Start: 2018-11-16 | End: 2018-11-16 | Stop reason: HOSPADM

## 2018-11-16 RX ORDER — OXYTOCIN IN DEXTROSE 5 % IN LR 20/1000 ML
1000 PLASTIC BAG, INJECTION (ML) INTRAVENOUS CONTINUOUS
Status: DISCONTINUED | OUTPATIENT
Start: 2018-11-16 | End: 2018-11-16 | Stop reason: RX

## 2018-11-16 RX ORDER — ONDANSETRON 4 MG/1
4 TABLET, ORALLY DISINTEGRATING ORAL EVERY 6 HOURS PRN
Status: DISCONTINUED | OUTPATIENT
Start: 2018-11-16 | End: 2018-11-16 | Stop reason: HOSPADM

## 2018-11-16 RX ORDER — HYDROCODONE BITARTRATE AND ACETAMINOPHEN 5; 325 MG/1; MG/1
2 TABLET ORAL EVERY 4 HOURS PRN
Status: DISCONTINUED | OUTPATIENT
Start: 2018-11-16 | End: 2018-11-16 | Stop reason: HOSPADM

## 2018-11-16 RX ORDER — PROMETHAZINE HYDROCHLORIDE 12.5 MG/1
12.5 SUPPOSITORY RECTAL EVERY 6 HOURS PRN
Status: DISCONTINUED | OUTPATIENT
Start: 2018-11-16 | End: 2018-11-18 | Stop reason: HOSPADM

## 2018-11-16 RX ORDER — ONDANSETRON 2 MG/ML
4 INJECTION INTRAMUSCULAR; INTRAVENOUS EVERY 6 HOURS PRN
Status: DISCONTINUED | OUTPATIENT
Start: 2018-11-16 | End: 2018-11-18 | Stop reason: HOSPADM

## 2018-11-16 RX ORDER — ONDANSETRON 2 MG/ML
4 INJECTION INTRAMUSCULAR; INTRAVENOUS EVERY 6 HOURS PRN
Status: DISCONTINUED | OUTPATIENT
Start: 2018-11-16 | End: 2018-11-16 | Stop reason: HOSPADM

## 2018-11-16 RX ORDER — PROMETHAZINE HYDROCHLORIDE 25 MG/ML
12.5 INJECTION, SOLUTION INTRAMUSCULAR; INTRAVENOUS EVERY 4 HOURS PRN
Status: DISCONTINUED | OUTPATIENT
Start: 2018-11-16 | End: 2018-11-16 | Stop reason: HOSPADM

## 2018-11-16 RX ORDER — ZOLPIDEM TARTRATE 5 MG/1
5 TABLET ORAL NIGHTLY PRN
Status: DISCONTINUED | OUTPATIENT
Start: 2018-11-16 | End: 2018-11-18 | Stop reason: HOSPADM

## 2018-11-16 RX ORDER — EPHEDRINE SULFATE 50 MG/ML
5 INJECTION, SOLUTION INTRAVENOUS
Status: DISCONTINUED | OUTPATIENT
Start: 2018-11-16 | End: 2018-11-16 | Stop reason: HOSPADM

## 2018-11-16 RX ORDER — PROMETHAZINE HYDROCHLORIDE 25 MG/ML
25 INJECTION, SOLUTION INTRAMUSCULAR; INTRAVENOUS EVERY 4 HOURS PRN
Status: DISCONTINUED | OUTPATIENT
Start: 2018-11-16 | End: 2018-11-16 | Stop reason: HOSPADM

## 2018-11-16 RX ORDER — PROMETHAZINE HYDROCHLORIDE 12.5 MG/1
12.5 SUPPOSITORY RECTAL EVERY 6 HOURS PRN
Status: DISCONTINUED | OUTPATIENT
Start: 2018-11-16 | End: 2018-11-16 | Stop reason: HOSPADM

## 2018-11-16 RX ORDER — MORPHINE SULFATE 2 MG/ML
4 INJECTION, SOLUTION INTRAMUSCULAR; INTRAVENOUS
Status: DISCONTINUED | OUTPATIENT
Start: 2018-11-16 | End: 2018-11-16 | Stop reason: HOSPADM

## 2018-11-16 RX ORDER — ONDANSETRON 4 MG/1
4 TABLET, FILM COATED ORAL EVERY 8 HOURS PRN
Status: DISCONTINUED | OUTPATIENT
Start: 2018-11-16 | End: 2018-11-18 | Stop reason: HOSPADM

## 2018-11-16 RX ORDER — IBUPROFEN 600 MG/1
600 TABLET ORAL EVERY 6 HOURS PRN
Status: DISCONTINUED | OUTPATIENT
Start: 2018-11-16 | End: 2018-11-16 | Stop reason: HOSPADM

## 2018-11-16 RX ORDER — CARBOPROST TROMETHAMINE 250 UG/ML
250 INJECTION, SOLUTION INTRAMUSCULAR ONCE AS NEEDED
Status: DISCONTINUED | OUTPATIENT
Start: 2018-11-16 | End: 2018-11-16 | Stop reason: HOSPADM

## 2018-11-16 RX ORDER — PROMETHAZINE HYDROCHLORIDE 25 MG/ML
12.5 INJECTION, SOLUTION INTRAMUSCULAR; INTRAVENOUS EVERY 6 HOURS PRN
Status: DISCONTINUED | OUTPATIENT
Start: 2018-11-16 | End: 2018-11-18 | Stop reason: HOSPADM

## 2018-11-16 RX ORDER — SODIUM CHLORIDE, SODIUM LACTATE, POTASSIUM CHLORIDE, CALCIUM CHLORIDE 600; 310; 30; 20 MG/100ML; MG/100ML; MG/100ML; MG/100ML
999 INJECTION, SOLUTION INTRAVENOUS ONCE
Status: DISCONTINUED | OUTPATIENT
Start: 2018-11-16 | End: 2018-11-16

## 2018-11-16 RX ORDER — HYDROCODONE BITARTRATE AND ACETAMINOPHEN 5; 325 MG/1; MG/1
1 TABLET ORAL EVERY 4 HOURS PRN
Status: DISCONTINUED | OUTPATIENT
Start: 2018-11-16 | End: 2018-11-18 | Stop reason: HOSPADM

## 2018-11-16 RX ADMIN — VANCOMYCIN HYDROCHLORIDE 1 G: 1 INJECTION, POWDER, LYOPHILIZED, FOR SOLUTION INTRAVENOUS at 10:02

## 2018-11-16 RX ADMIN — HYDROCODONE BITARTRATE AND ACETAMINOPHEN 1 TABLET: 5; 325 TABLET ORAL at 17:35

## 2018-11-16 RX ADMIN — Medication: at 18:37

## 2018-11-16 RX ADMIN — BENZOCAINE AND LEVOMENTHOL: 200; 5 SPRAY TOPICAL at 18:37

## 2018-11-16 RX ADMIN — LIDOCAINE HYDROCHLORIDE 20 ML: 10 INJECTION, SOLUTION INFILTRATION; PERINEURAL at 12:00

## 2018-11-16 RX ADMIN — IBUPROFEN 600 MG: 600 TABLET ORAL at 23:22

## 2018-11-16 RX ADMIN — SODIUM CHLORIDE, POTASSIUM CHLORIDE, SODIUM LACTATE AND CALCIUM CHLORIDE 125 ML/HR: 600; 310; 30; 20 INJECTION, SOLUTION INTRAVENOUS at 00:31

## 2018-11-16 RX ADMIN — BUPIVACAINE HYDROCHLORIDE 8 ML: 2.5 INJECTION, SOLUTION EPIDURAL; INFILTRATION; INTRACAUDAL; PERINEURAL at 08:33

## 2018-11-16 RX ADMIN — Medication 2 MILLI-UNITS/MIN: at 05:02

## 2018-11-16 NOTE — H&P
" Ayo  Karmen Trcaey  : 1990  MRN: [id number]  CSN: [id number]    Chief Complain:  elevated BP's    History and Physical    Karmen Tracey is a 28 y.o. year old  with an Estimated Date of Delivery: 18 currently at 38w3d presenting for induction of labor for  gestational hypertension.   Presently no h/a's or visual disturbances.      Risks of labor induction including prolongation of labor, increased risks for both  section and operative vaginal birth have been discussed at length.     Prenatal care has been with [facility].  Prenatal course has been uncomplicated with exception elevated BP's.      TWG 23 pounds  Total visits 16    Obstetric History       T0      L0     SAB0   TAB0   Ectopic0   Molar0   Multiple0   Live Births0       # Outcome Date GA Lbr [place]/2nd Weight Sex Delivery Anes PTL Lv   1 Current                 Past Medical History:   Diagnosis Date   • Anxiety    • Gestational hypertension     current   • History of Papanicolaou smear of cervix     normal   • Migraine    • Oral contraceptive use    • Personal history of leukemia    • Positive testing for group B Streptococcus 10/25/2018     Past Surgical History:   Procedure Laterality Date   • ADENOIDECTOMY     • BREAST LUMPECTOMY Left 2018    benign    • TONSILLECTOMY     • WISDOM TOOTH EXTRACTION         Review of Systems        Pertinent items are noted in HPI, all other systems reviewed and negative  Allergies   Allergen Reactions   • Amoxicillin-Pot Clavulanate Rash   • Cefaclor Rash     Social History    Tobacco Use      Smoking status: Never Smoker      Smokeless tobacco: Never Used      /80   Pulse 67   Temp 98.4 °F (36.9 °C) (Oral)   Resp 16   Ht 160 cm (63\")   Wt 73.9 kg (163 lb)   LMP 2018   SpO2 99%   Breastfeeding? Yes   BMI 28.87 kg/m²     Physical Exam       Psych: Altert and oriented to time, place and person  Mood and affect " appropriate   General: well developed; well nourished  no acute distress  Head: normocephalic  Heart: regular rate and rhythm, no murmur  Lungs:  breathing is unlabored  clear to auscultation bilaterally  Abdomen: Gravid - soft and non-tender   occasional mild contractions  FHT's 1125 + accels and variability  Lower Extremities: LE: Neg edema, DTR's 2+  and  Neg. Clonus  V/E: 2.80% post cx / cephalic                   Prenatal Labs  Lab Results   Component Value Date    HGB 13.8 11/15/2018    HEPBSAG Negative 2018    ABSCRN Negative 11/15/2018    [id number] Non Reactive 2018    STREPGPB Positive (A) 10/25/2018    URINECX Final report 2018       Current Labs Reviewed   CBC, CMP and UA    Assessment:  1. UP with an Estimated Date of Delivery: 18  2. Induction of labor because of gestational hypertension  3. Group B strep status: positive  4. FHT's reassuring     Plan:    1.  Admit to  with RSO Pitocin slow drip  2.  Amniotomy with descent of presenting part  3.  Anticipate    4.  Encourage questions and answered    Padmini Youngblood CNM  11/15/2018  9:34 PM

## 2018-11-16 NOTE — L&D DELIVERY NOTE
[facility]  Vaginal Delivery Note    Delivery     Delivery: Vaginal, Spontaneous     YOB: 2017    Time of Birth: 11:17 AM      Anesthesia: Epidural     Delivering clinician: Padmini Youngblood    Forceps?   No   Vacuum? No    Shoulder dystocia present: No        Delivery narrative:     Pt pushing well - small snip of intravaginal band - upon delivery infant delivered hand to face.  Cord clamped and cut and to moms abdomen.   Immediately proceeded to cord blood bank collection and followed as documented.   Spontaneous delivery of placenta - intact - 20 units pitocin to IVF's - F/F with message.     Vaginal inspection: 2nd degree sutured with Chrooo.  Lidocaine 1% local prior to repair.      cc          Infant    Findings: male  infant     Infant observations: Weight: 2863 g (6 lb 5 oz)   Length: 19.5  in  Observations/Comments:         Apgars: 8   @ 1 minute /    9   @ 5 minutes   Infant Name: Francisco     Placenta, Cord, and Fluid    Placenta delivered  Spontaneous  at   11/16/2018 11:58 AM     Cord: 3 vessels  present.   Nuchal Cord?  no   Cord blood obtained: Yes    Cord gases obtained:  No    Cord gas results: Venous:  No results found for: PHCVEN    Arterial:  No results found for: PHCART     Repair    Episiotomy: None    Lacerations: Yes  Laceration Information  Laceration Repaired?   Perineal: 2nd  Yes    Periurethral:         Labial:         Sulcus:         Vaginal:         Cervical:                      Complications  none    Disposition  Mother to Mother Baby/Postpartum  in stable condition currently.  Baby to remains with mom  in stable condition currently.      Padmini Youngblood CNM  11/16/18  1:40 PM

## 2018-11-16 NOTE — ANESTHESIA PREPROCEDURE EVALUATION
Anesthesia Evaluation     Patient summary reviewed and Nursing notes reviewed   NPO Solid Status: > 8 hours  NPO Liquid Status: > 8 hours           Airway   Mallampati: II  TM distance: >3 FB  Neck ROM: full  No difficulty expected  Dental - normal exam     Pulmonary - negative pulmonary ROS and normal exam   Cardiovascular - normal exam    (+) hypertension,       Neuro/Psych  (+) headaches,     GI/Hepatic/Renal/Endo - negative ROS     Musculoskeletal (-) negative ROS    Abdominal  - normal exam    Bowel sounds: normal.   Substance History - negative use     OB/GYN    (+) Pregnant,         Other                        Anesthesia Plan    ASA 2     epidural     Anesthetic plan, all risks, benefits, and alternatives have been provided, discussed and informed consent has been obtained with: patient and spouse/significant other.

## 2018-11-16 NOTE — PROGRESS NOTES
Pt now comfortable with epidural in  Prior to epidural:  Pt with c/o painful contractions   VSS - AF  Contractions q 2-4 min  FHT's 130 + accels and variability   V/E 3-4 /90% soft very post - membranes intact  Epidural was ordered at that time

## 2018-11-16 NOTE — NON STRESS TEST
Karmen Tracey, a  at 38w3d with an CLARE of 2018, by Ultrasound, was seen at [facility] for a nonstress test.    Chief Complaint   Patient presents with   • Non-stress Test     Blood pressure       Patient Active Problem List   Diagnosis   • Acne   • Anxiety disorder   • Benign ovarian cyst   • Elevated cortisol level (CMS/HCC)   • Elevated DHEA (CMS/HCC)   • Hypertension   • Palpitations   • Oral contraceptive use   • Personal history of leukemia   • Supervision of normal pregnancy   • Positive testing for group B Streptococcus       Start Time:   Stop Time:     Interpretation A  Nonstress Test Interpretation A: Reactive (11/15/18 1921 : Mariaelena Stewart RN)

## 2018-11-16 NOTE — ANESTHESIA POSTPROCEDURE EVALUATION
Patient: Karmen Tracey    Procedure Summary     Date:  11/16/18 Room / Location:      Anesthesia Start:  0814 Anesthesia Stop:  1258    Procedure:  LABOR ANALGESIA Diagnosis:      Scheduled Providers:   Provider:  Simone Armstrong CRNA    Anesthesia Type:  epidural ASA Status:  2          Anesthesia Type: epidural  Last vitals  BP   119/77 (11/16/18 1216)   Temp   98.6 °F (37 °C) (11/16/18 1108)   Pulse   102 (11/16/18 1216)   Resp   16 (11/15/18 1721)     SpO2   99 % (11/16/18 0930)     Post Anesthesia Care and Evaluation    Patient location during evaluation: PACU  Patient participation: complete - patient participated  Level of consciousness: awake and alert  Pain score: 1  Pain management: satisfactory to patient  Airway patency: patent  Anesthetic complications: No anesthetic complications  PONV Status: none  Cardiovascular status: acceptable and hemodynamically stable  Respiratory status: acceptable  Hydration status: acceptable

## 2018-11-16 NOTE — ANESTHESIA PROCEDURE NOTES
Labor Epidural      Patient location during procedure: OB  Start Time: 11/16/2018 8:21 AM  Stop Time: 11/16/2018 8:26 AM  Indication:at surgeon's request  Performed By  CRNA: Simone Armstrong CRNA  Preanesthetic Checklist  Completed: patient identified, site marked, surgical consent, pre-op evaluation, timeout performed, IV checked, risks and benefits discussed and monitors and equipment checked  Additional Notes  0825  Test dose negative  Prep:  Pt Position:sitting  Sterile Tech:cap, gloves, mask and sterile barrier  Prep:chlorhexidine gluconate and isopropyl alcohol  Monitoring:blood pressure monitoring and continuous pulse oximetry  Epidural Block Procedure:  Approach:midline  Guidance:landmark technique and palpation technique  Location:L4-L5  Needle Type:Tuohy  Needle Gauge:18 G  Loss of Resistance Medium: saline  Loss of Resistance: 7cm  Cath Depth at skin:11 cm  Paresthesia: none  Aspiration:negative  Test Dose:negative  Post Assessment:  Dressing:secured with tape  Pt Tolerance:patient tolerated the procedure well with no apparent complications  Complications:no

## 2018-11-16 NOTE — PLAN OF CARE
Problem: Labor (Cervical Ripen, Induct, Augment) (Adult,Obstetrics,Pediatric)  Goal: Signs and Symptoms of Listed Potential Problems Will be Absent, Minimized or Managed (Labor)  Outcome: Outcome(s) achieved Date Met: 11/16/18

## 2018-11-16 NOTE — PLAN OF CARE
Problem: Patient Care Overview  Goal: Plan of Care Review  Outcome: Ongoing (interventions implemented as appropriate)   11/16/18 0539   Coping/Psychosocial   Plan of Care Reviewed With patient;spouse     Goal: Individualization and Mutuality  Outcome: Ongoing (interventions implemented as appropriate)    Goal: Discharge Needs Assessment  Outcome: Ongoing (interventions implemented as appropriate)    Goal: Interprofessional Rounds/Family Conf  Outcome: Ongoing (interventions implemented as appropriate)      Problem: Labor (Cervical Ripen, Induct, Augment) (Adult,Obstetrics,Pediatric)  Goal: Signs and Symptoms of Listed Potential Problems Will be Absent, Minimized or Managed (Labor)  Outcome: Ongoing (interventions implemented as appropriate)      Problem: Hypertensive Disorders in Pregnancy (Adult,Obstetrics,Pediatric)  Goal: Signs and Symptoms of Listed Potential Problems Will be Absent, Minimized or Managed (Hypertensive Disorders in Pregnancy)  Outcome: Ongoing (interventions implemented as appropriate)

## 2018-11-17 LAB
BASOPHILS # BLD AUTO: 0.03 10*3/MM3 (ref 0–0.2)
BASOPHILS NFR BLD AUTO: 0.3 % (ref 0–2.5)
DEPRECATED RDW RBC AUTO: 46.5 FL (ref 37–54)
EOSINOPHIL # BLD AUTO: 0.07 10*3/MM3 (ref 0–0.7)
EOSINOPHIL NFR BLD AUTO: 0.6 % (ref 0–7)
ERYTHROCYTE [DISTWIDTH] IN BLOOD BY AUTOMATED COUNT: 13.1 % (ref 11.5–14.5)
HCT VFR BLD AUTO: 34.8 % (ref 37–47)
HGB BLD-MCNC: 11.4 G/DL (ref 12–16)
IMM GRANULOCYTES # BLD: 0.03 10*3/MM3 (ref 0–0.06)
IMM GRANULOCYTES NFR BLD: 0.3 % (ref 0–0.6)
LYMPHOCYTES # BLD AUTO: 1.81 10*3/MM3 (ref 0.6–3.4)
LYMPHOCYTES NFR BLD AUTO: 15.2 % (ref 10–50)
MCH RBC QN AUTO: 31.6 PG (ref 27–31)
MCHC RBC AUTO-ENTMCNC: 32.8 G/DL (ref 30–37)
MCV RBC AUTO: 96.4 FL (ref 81–99)
MONOCYTES # BLD AUTO: 0.77 10*3/MM3 (ref 0–0.9)
MONOCYTES NFR BLD AUTO: 6.4 % (ref 0–12)
NEUTROPHILS # BLD AUTO: 9.23 10*3/MM3 (ref 2–6.9)
NEUTROPHILS NFR BLD AUTO: 77.2 % (ref 37–80)
NRBC BLD MANUAL-RTO: 0 /100 WBC (ref 0–0)
PLATELET # BLD AUTO: 220 10*3/MM3 (ref 130–400)
PMV BLD AUTO: 9.5 FL (ref 6–12)
RBC # BLD AUTO: 3.61 10*6/MM3 (ref 4.2–5.4)
WBC NRBC COR # BLD: 11.94 10*3/MM3 (ref 4.8–10.8)

## 2018-11-17 PROCEDURE — 85025 COMPLETE CBC W/AUTO DIFF WBC: CPT | Performed by: NURSE PRACTITIONER

## 2018-11-17 PROCEDURE — 99024 POSTOP FOLLOW-UP VISIT: CPT | Performed by: NURSE PRACTITIONER

## 2018-11-17 RX ADMIN — IBUPROFEN 600 MG: 600 TABLET ORAL at 08:16

## 2018-11-17 RX ADMIN — PRENATAL VIT W/ FE FUMARATE-FA TAB 27-0.8 MG 1 TABLET: 27-0.8 TAB at 08:16

## 2018-11-17 RX ADMIN — DOCUSATE SODIUM 200 MG: 100 CAPSULE, LIQUID FILLED ORAL at 18:26

## 2018-11-17 RX ADMIN — IBUPROFEN 600 MG: 600 TABLET ORAL at 18:27

## 2018-11-17 NOTE — PLAN OF CARE
Problem: Patient Care Overview  Goal: Plan of Care Review  Outcome: Ongoing (interventions implemented as appropriate)   11/17/18 1441   Coping/Psychosocial   Plan of Care Reviewed With patient   Plan of Care Review   Progress improving     Goal: Individualization and Mutuality  Outcome: Ongoing (interventions implemented as appropriate)   11/16/18 0539 11/16/18 1800   Individualization   Patient Specific Preferences --  wants to breast feed   Patient Specific Goals (Include Timeframe) --  pain control   Patient Specific Interventions --  learn to take care of baby   Mutuality/Individual Preferences   What Anxieties, Fears, Concerns, or Questions Do You Have About Your Care? normal labor fears --    What Information Would Help Us Give You More Personalized Care? education --    How Would You and/or Your Support Person Like to Participate in Your Care? --  support and reassurance   Mutuality/Individual Preferences   How to Address Anxieties/Fears talking --      Goal: Discharge Needs Assessment  Outcome: Ongoing (interventions implemented as appropriate)   11/17/18 1441   Discharge Needs Assessment   Readmission Within the Last 30 Days no previous admission in last 30 days   Concerns to be Addressed no discharge needs identified   Patient/Family Anticipates Transition to home   Patient/Family Anticipated Services at Transition none   Transportation Concerns car, none   Transportation Anticipated family or friend will provide   Anticipated Changes Related to Illness none   Equipment Needed After Discharge none   Disability   Equipment Currently Used at Home none     Goal: Interprofessional Rounds/Family Conf  Outcome: Ongoing (interventions implemented as appropriate)      Problem: Hypertensive Disorders in Pregnancy (Adult,Obstetrics,Pediatric)  Goal: Signs and Symptoms of Listed Potential Problems Will be Absent, Minimized or Managed (Hypertensive Disorders in Pregnancy)  Outcome: Ongoing (interventions implemented  as appropriate)   11/17/18 1441   Goal/Outcome Evaluation   Problems Assessed (Hypertensive Disorders in Pregnancy) all   Problems Present (Hypertensive/in PG) none       Problem: Postpartum (Vaginal Delivery) (Adult,Obstetrics,Pediatric)  Goal: Signs and Symptoms of Listed Potential Problems Will be Absent, Minimized or Managed (Postpartum)  Outcome: Ongoing (interventions implemented as appropriate)   11/17/18 1441   Goal/Outcome Evaluation   Problems Assessed (Postpartum Vaginal Delivery) all   Problems Present (Postpartum Vag Deliv) none

## 2018-11-17 NOTE — PROGRESS NOTES
JUDY Rollins    POSTPARTUM DAY 1      Subjective     Patient reports:   Doing well & no c/o.  Voiding and ambulating without difficulty.    Reg diet & tolerating.  Lochia light.            Objective      Vitals: Vital Signs Range for the last 24 hours  Temperature: Temp:  [97.5 °F (36.4 °C)-98.5 °F (36.9 °C)] 98.5 °F (36.9 °C)   Temp Source: Temp src: Oral   BP: BP: (107-133)/(43-99) 123/80   Pulse: Heart Rate:  [] 87   Respirations: Resp:  [18] 18   SPO2: SpO2:  [95 %-99 %] 95 %   O2 Amount (l/min):     O2 Devices     Weight:                PHYSICAL EXAM:    Psych: Altert and oriented to time, place and person  Mood and affect appropriate   General: well developed; well nourished  no acute distress  Lungs:  breathing is unlabored  Abdomen: not checked - pt up ambulating  Lower Extremities: LE: Neg edema and no calf tenderness                                                                                                                        I reviewed the patient's new clinical results.    @ASSESSMENT PLANBEGIN@       Pregnancy      Assessment:    Karmen Tracey is Day 1  post-partum vaginal delivery  Vaginal, Spontaneous    .       Plan:  Continue PP Orders.  Anticipate D/C home tomorrow.    Padmini Youngblood CNM  11/17/2018  11:26 AM

## 2018-11-17 NOTE — PLAN OF CARE
Problem: Patient Care Overview  Goal: Plan of Care Review  Outcome: Ongoing (interventions implemented as appropriate)   11/16/18 1800   Coping/Psychosocial   Plan of Care Reviewed With patient   Plan of Care Review   Progress improving   OTHER   Outcome Summary VSS, routine pp care     Goal: Individualization and Mutuality  Outcome: Ongoing (interventions implemented as appropriate)    Goal: Discharge Needs Assessment  Outcome: Ongoing (interventions implemented as appropriate)    Goal: Interprofessional Rounds/Family Conf  Outcome: Ongoing (interventions implemented as appropriate)      Problem: Hypertensive Disorders in Pregnancy (Adult,Obstetrics,Pediatric)  Goal: Signs and Symptoms of Listed Potential Problems Will be Absent, Minimized or Managed (Hypertensive Disorders in Pregnancy)  Outcome: Ongoing (interventions implemented as appropriate)      Problem: Postpartum (Vaginal Delivery) (Adult,Obstetrics,Pediatric)  Goal: Signs and Symptoms of Listed Potential Problems Will be Absent, Minimized or Managed (Postpartum)  Outcome: Ongoing (interventions implemented as appropriate)

## 2018-11-18 VITALS
WEIGHT: 163 LBS | HEART RATE: 77 BPM | SYSTOLIC BLOOD PRESSURE: 134 MMHG | RESPIRATION RATE: 18 BRPM | HEIGHT: 63 IN | DIASTOLIC BLOOD PRESSURE: 86 MMHG | BODY MASS INDEX: 28.88 KG/M2 | TEMPERATURE: 97.5 F | OXYGEN SATURATION: 95 %

## 2018-11-18 PROCEDURE — 99024 POSTOP FOLLOW-UP VISIT: CPT | Performed by: NURSE PRACTITIONER

## 2018-11-18 RX ORDER — PSEUDOEPHEDRINE HCL 30 MG
100 TABLET ORAL 2 TIMES DAILY PRN
Qty: 60 EACH | Refills: 0 | Status: SHIPPED | OUTPATIENT
Start: 2018-11-18 | End: 2021-08-03

## 2018-11-18 RX ORDER — IBUPROFEN 600 MG/1
600 TABLET ORAL EVERY 6 HOURS PRN
Qty: 90 TABLET | Refills: 0 | Status: SHIPPED | OUTPATIENT
Start: 2018-11-18 | End: 2021-08-03

## 2018-11-18 RX ADMIN — PRENATAL VIT W/ FE FUMARATE-FA TAB 27-0.8 MG 1 TABLET: 27-0.8 TAB at 08:23

## 2018-11-18 RX ADMIN — IBUPROFEN 600 MG: 600 TABLET ORAL at 04:34

## 2018-11-18 NOTE — PLAN OF CARE
Problem: Patient Care Overview  Goal: Plan of Care Review  Outcome: Ongoing (interventions implemented as appropriate)   11/16/18 1800 11/17/18 1441   Coping/Psychosocial   Plan of Care Reviewed With --  patient   Plan of Care Review   Progress --  improving   OTHER   Outcome Summary VSS, routine pp care --      Goal: Individualization and Mutuality  Outcome: Ongoing (interventions implemented as appropriate)   11/16/18 0539 11/16/18 1800   Individualization   Patient Specific Preferences --  wants to breast feed   Patient Specific Goals (Include Timeframe) --  pain control   Patient Specific Interventions --  learn to take care of baby   Mutuality/Individual Preferences   What Anxieties, Fears, Concerns, or Questions Do You Have About Your Care? normal labor fears --    What Information Would Help Us Give You More Personalized Care? education --    How Would You and/or Your Support Person Like to Participate in Your Care? --  support and reassurance   Mutuality/Individual Preferences   How to Address Anxieties/Fears talking --      Goal: Discharge Needs Assessment  Outcome: Ongoing (interventions implemented as appropriate)   11/17/18 1441 11/18/18 0356   Discharge Needs Assessment   Readmission Within the Last 30 Days no previous admission in last 30 days --    Concerns to be Addressed no discharge needs identified --    Patient/Family Anticipates Transition to home --    Patient/Family Anticipated Services at Transition none --    Transportation Concerns car, none --    Transportation Anticipated family or friend will provide --    Anticipated Changes Related to Illness none --    Equipment Needed After Discharge none --    Discharge Coordination/Progress --  home with mom tomorrow   Disability   Equipment Currently Used at Home none --      Goal: Interprofessional Rounds/Family Conf   11/16/18 1800   Interdisciplinary Rounds/Family Conf   Summary review plan of care   Participants nursing;patient;physician        Problem: Postpartum (Vaginal Delivery) (Adult,Obstetrics,Pediatric)  Goal: Signs and Symptoms of Listed Potential Problems Will be Absent, Minimized or Managed (Postpartum)  Outcome: Ongoing (interventions implemented as appropriate)   11/17/18 7866   Goal/Outcome Evaluation   Problems Assessed (Postpartum Vaginal Delivery) all   Problems Present (Postpartum Vag Deliv) none

## 2018-11-18 NOTE — DISCHARGE SUMMARY
Discharge Summary     [facility]  Karmen Tracey  : 1990  MRN: [id number]  CSN: [id number]    Date of Admission: 11/15/2018   Date of Discharge:  2018   Delivering Physician: Padmini Youngblood        Admission Diagnosis: 1. Pregnancy [Z34.90]   Discharge Diagnosis: 1. Same as above plus  2. Pregnancy at 38w4d - delivered       Procedures: 2018  - Vaginal, Spontaneous       Hospital Course  Patient is a 28 y.o.  who at 38w4d had a vaginal birth.  Her postpartum course was without complications.  On PPD #2 she was ready for discharge.  She had normal lochia and pain well controlled with oral medications.    We discussed hx of HTN / PIH precautions - states has BP cuff at home and can check.      Infant  male  fetus weighing 2863 g (6 lb 5 oz)   Apgars -  8  @ 1 minute /  9  @ 5 minutes.    Discharge labs  Lab Results   Component Value Date    WBC 11.94 (H) 2018    HGB 11.4 (L) 2018    HCT 34.8 (L) 2018     2018       Discharge Medications     Discharge Medications      New Medications      Instructions Start Date   docusate sodium 100 MG capsule   100 mg, Oral, 2 Times Daily PRN      ibuprofen 600 MG tablet  Commonly known as:  ADVIL,MOTRIN   600 mg, Oral, Every 6 Hours PRN         Continue These Medications      Instructions Start Date   prenatal vitamin 27-0.8 27-0.8 MG tablet tablet   Oral, Daily      ZYRTEC ALLERGY 10 MG tablet  Generic drug:  cetirizine   Oral, Daily             Discharge Disposition Home or Self Care   Condition on Discharge: good   Follow-up: 6 weeks  At  [facility]      Padmini Youngblood CNM  2018

## 2018-12-28 ENCOUNTER — POSTPARTUM VISIT (OUTPATIENT)
Dept: OBSTETRICS AND GYNECOLOGY | Facility: CLINIC | Age: 28
End: 2018-12-28

## 2018-12-28 VITALS
SYSTOLIC BLOOD PRESSURE: 128 MMHG | BODY MASS INDEX: 26.05 KG/M2 | DIASTOLIC BLOOD PRESSURE: 76 MMHG | WEIGHT: 147 LBS | HEIGHT: 63 IN

## 2018-12-28 PROCEDURE — 0503F POSTPARTUM CARE VISIT: CPT | Performed by: NURSE PRACTITIONER

## 2018-12-28 NOTE — PROGRESS NOTES
Padmini Youngblood CNM  2018      Subjective   Chief Complaint   Patient presents with   • Postpartum Care     6 week post partum, vaginal delivery, last pap: 10/12/17 WNL, patients wants birth control pills     Karmen Tracey is a 28 y.o. year old  presenting to be seen for her postpartum visit.  She had a vaginal delivery.    Since delivery she has not been sexually active.  She does not have concerns about post-partum blues/depression.   She is bottle feeding.  For ongoing contraception, her plans are undecided.  She would like OC's with estrogen - though she does have a hx: of CHTN.  She has used micronor in the past    The following portions of the patient's history were reviewed and updated as appropriate:current medications and allergies    Social History    Tobacco Use      Smoking status: Never Smoker      Smokeless tobacco: Never Used          Review of Systems    Pertinent items are noted in HPI, all other systems reviewed and negative      Objective     Physical Exam  Constitutional   The patient is awake, well developed & well nourished.   Abdomen   The abdomen is soft and non tender.  No masses   Genitourinary Pelvis:    - External Genitalia without erythema, lesions, or masses  -Urethral Meatus is without erythema, edema, prolapse or lesions.   -Bladder - Palpation at the region above the symphysis pubis             reveals no bladder tenderness.   -Vagina - There is no excessive vaginal discharge.&    vaginal walls reveals moist vaginal mucosa without inflammation or lesions    There is no evidence of pelvic relaxation; there is no cystocele or rectocele.  -Cervix  is smooth, pink, and without discharge. Negative cervical motion tenderness   Uterus - uterine body is of normal size, shape, & without tenderness  Adnexa structures are nontender and without masses  Perineum is without inflammation or lesions  Musculoskeletal  Negative  Extremities  Full ROM   Psychiatric  The  patient is oriented to person, place, and time. Speech is fluent and words are clear.          Assessment   Normal 6 week postpartum exam  Hx CHTN  Desires combination OC's     Plan   Pap smear done   BC options reviewed:  We discussed other options of BC other than estrogen related including mirena, nexplanon, and depot provera - benefits and risks.  I also informed if BP's were well controlled - OC's would be fine to use.  BP today normal - we could try lo loestrin & 2 samples provided.  Pt states she is able to ck BP's at home and does have an appt with PCP this January.  Will call re: BP's with the pills vs considering mirena    Follow up in 3 months or may call re: Birth control decision .    No orders of the defined types were placed in this encounter.         This note was electronically signed.  Padmini Youngblood CNM

## 2019-02-26 ENCOUNTER — OFFICE VISIT (OUTPATIENT)
Dept: OBSTETRICS AND GYNECOLOGY | Facility: CLINIC | Age: 29
End: 2019-02-26

## 2019-02-26 VITALS
HEIGHT: 63 IN | SYSTOLIC BLOOD PRESSURE: 120 MMHG | WEIGHT: 146 LBS | BODY MASS INDEX: 25.87 KG/M2 | DIASTOLIC BLOOD PRESSURE: 78 MMHG

## 2019-02-26 DIAGNOSIS — N64.59 ABNORMAL BREAST EXAM: Primary | ICD-10-CM

## 2019-02-26 DIAGNOSIS — N63.12 BREAST LUMP ON RIGHT SIDE AT 2 O'CLOCK POSITION: ICD-10-CM

## 2019-02-26 PROCEDURE — 99214 OFFICE O/P EST MOD 30 MIN: CPT | Performed by: PHYSICIAN ASSISTANT

## 2019-02-26 NOTE — PROGRESS NOTES
Subjective   Chief Complaint   Patient presents with   • Breast Problem     Lump in right breast; would like a referral to the breast center       Karmenwilma Tracey is a 28 y.o. year old  presenting to be seen for right breast lump.  Noted right breast lump approximately 4 weeks ago.  It has not changed in size or characteristic however it did not resolve after having last menses.  Patient is 3 months postpartum she is not nursing.  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 Yarbrough. Patient had had multiple ultrasounds and biopsies at [facility] before she was referred for excision of fibroadenomas.  She is not on any hormonal birth control.  She has a maternal grandmother with a history of breast cancer.    Past Medical History:   Diagnosis Date   • Anxiety    • Gestational hypertension     current   • History of Papanicolaou smear of cervix     normal   • Migraine    • Oral contraceptive use    • Personal history of leukemia    • Positive testing for group B Streptococcus 10/25/2018        Current Outpatient Medications:   •  cetirizine (ZYRTEC ALLERGY) 10 MG tablet, Take  by mouth daily., Disp: , Rfl:   •  docusate sodium 100 MG capsule, Take 100 mg by mouth 2 (Two) Times a Day As Needed for Constipation., Disp: 60 each, Rfl: 0  •  ibuprofen (ADVIL,MOTRIN) 600 MG tablet, Take 1 tablet by mouth Every 6 (Six) Hours As Needed for Mild Pain  or Moderate Pain  for up to 90 doses., Disp: 90 tablet, Rfl: 0  •  Prenatal Vit-Fe Fumarate-FA (PRENATAL VITAMIN 27-0.8) 27-0.8 MG tablet tablet, Take  by mouth Daily., Disp: , Rfl:    Allergies   Allergen Reactions   • Amoxicillin-Pot Clavulanate Rash   • Cefaclor Rash      Past Surgical History:   Procedure Laterality Date   • ADENOIDECTOMY     • BREAST BIOPSY     • BREAST LUMPECTOMY Left 2018    benign    • TONSILLECTOMY     • WISDOM TOOTH EXTRACTION        Social History  "    Socioeconomic History   • Marital status:      Spouse name: Not on file   • Number of children: Not on file   • Years of education: Not on file   • Highest education level: Not on file   Social Needs   • Financial resource strain: Not on file   • Food insecurity - worry: Not on file   • Food insecurity - inability: Not on file   • Transportation needs - medical: Not on file   • Transportation needs - non-medical: Not on file   Occupational History   • Not on file   Tobacco Use   • Smoking status: Never Smoker   • Smokeless tobacco: Never Used   Substance and Sexual Activity   • Alcohol use: No     Comment: Social alcohol use   • Drug use: No   • Sexual activity: Yes     Partners: Male     Birth control/protection: None   Other Topics Concern   • Not on file   Social History Narrative   • Not on file      Family History   Problem Relation Age of Onset   • Breast cancer Maternal Grandmother    • Stroke Maternal Grandmother    • Hypertension Maternal Grandmother    • Hypertension Father    • Hypertension Mother    • Diabetes Sister    • Stroke Paternal Grandfather    • Hypertension Paternal Grandfather    • Hypertension Paternal Grandmother    • Heart attack Paternal Grandmother    • Hypertension Maternal Grandfather        Review of Systems   Constitutional: Negative.    Gastrointestinal: Negative.    Genitourinary: Negative.            Objective   /78   Ht 160 cm (63\")   Wt 66.2 kg (146 lb)   LMP 02/19/2019   Breastfeeding? No   BMI 25.86 kg/m²     Physical Exam   Constitutional: She appears well-developed and well-nourished. She is cooperative.   Pulmonary/Chest: Right breast exhibits no inverted nipple, no mass, no nipple discharge, no skin change and no tenderness. Left breast exhibits inverted nipple. Left breast exhibits no mass, no nipple discharge, no skin change and no tenderness.   1.5 cm firm mobile smooth nodule 2 oclock right breast       Neurological: She is alert.   Skin: Skin is " warm and dry.   Psychiatric: She has a normal mood and affect. Her behavior is normal.            Assessment and Plan  Karmen was seen today for breast problem.    Diagnoses and all orders for this visit:    Abnormal breast exam  -     US Breast Right Limited; Future    Breast lump on right side at 2 o'clock position  -     US Breast Right Limited; Future      Patient Instructions   Will proceed with right breast ultrasound at [facility] as patient had previous breast ultrasounds done there. Possible referral to surgeon pending ultrasound results             This note was electronically signed.    Sushma Prakash PA-C   February 26, 2019

## 2019-02-26 NOTE — PATIENT INSTRUCTIONS
Will proceed with right breast ultrasound at [facility] as patient had previous breast ultrasounds done there. Possible referral to surgeon pending ultrasound results

## 2019-03-04 DIAGNOSIS — N64.59 ABNORMAL BREAST EXAM: ICD-10-CM

## 2019-03-04 DIAGNOSIS — N63.12 BREAST LUMP ON RIGHT SIDE AT 2 O'CLOCK POSITION: ICD-10-CM

## 2020-01-14 ENCOUNTER — OFFICE VISIT (OUTPATIENT)
Dept: OBSTETRICS AND GYNECOLOGY | Facility: CLINIC | Age: 30
End: 2020-01-14

## 2020-01-14 VITALS
BODY MASS INDEX: 24.27 KG/M2 | SYSTOLIC BLOOD PRESSURE: 124 MMHG | HEIGHT: 63 IN | WEIGHT: 137 LBS | DIASTOLIC BLOOD PRESSURE: 78 MMHG

## 2020-01-14 DIAGNOSIS — Z01.419 WELL WOMAN EXAM WITH ROUTINE GYNECOLOGICAL EXAM: Primary | ICD-10-CM

## 2020-01-14 DIAGNOSIS — Z12.4 SCREENING FOR MALIGNANT NEOPLASM OF CERVIX: ICD-10-CM

## 2020-01-14 DIAGNOSIS — Z30.09 GENERAL COUNSELING AND ADVICE ON FEMALE CONTRACEPTION: ICD-10-CM

## 2020-01-14 PROCEDURE — 99395 PREV VISIT EST AGE 18-39: CPT | Performed by: OBSTETRICS & GYNECOLOGY

## 2020-01-14 RX ORDER — TERBINAFINE HYDROCHLORIDE 250 MG/1
250 TABLET ORAL DAILY
COMMUNITY
End: 2021-08-03

## 2020-01-20 DIAGNOSIS — Z12.4 SCREENING FOR MALIGNANT NEOPLASM OF CERVIX: ICD-10-CM

## 2020-06-22 ENCOUNTER — TRANSCRIBE ORDERS (OUTPATIENT)
Dept: ADMINISTRATIVE | Facility: HOSPITAL | Age: 30
End: 2020-06-22

## 2020-06-22 DIAGNOSIS — C91.01 ALL (ACUTE LYMPHOID LEUKEMIA) IN REMISSION (HCC): Primary | ICD-10-CM

## 2020-06-29 ENCOUNTER — HOSPITAL ENCOUNTER (OUTPATIENT)
Dept: CARDIOLOGY | Facility: HOSPITAL | Age: 30
Discharge: HOME | End: 2020-06-29
Admitting: PEDIATRICS

## 2020-06-29 DIAGNOSIS — C91.01 ALL (ACUTE LYMPHOID LEUKEMIA) IN REMISSION (HCC): ICD-10-CM

## 2020-06-29 PROCEDURE — 93356 MYOCRD STRAIN IMG SPCKL TRCK: CPT

## 2020-06-29 PROCEDURE — 93306 TTE W/DOPPLER COMPLETE: CPT

## 2020-06-29 PROCEDURE — 93356 MYOCRD STRAIN IMG SPCKL TRCK: CPT | Performed by: INTERNAL MEDICINE

## 2020-06-29 PROCEDURE — 93306 TTE W/DOPPLER COMPLETE: CPT | Performed by: INTERNAL MEDICINE

## 2020-07-02 LAB
BH CV ECHO MEAS - AO ROOT AREA (BSA CORRECTED): 1.9
BH CV ECHO MEAS - AO ROOT AREA: 8 CM^2
BH CV ECHO MEAS - AO ROOT DIAM: 3.2 CM
BH CV ECHO MEAS - ASC AORTA: 2.5 CM
BH CV ECHO MEAS - BSA(HAYCOCK): 1.7 M^2
BH CV ECHO MEAS - BSA: 1.7 M^2
BH CV ECHO MEAS - BZI_BMI: 24.8 KILOGRAMS/M^2
BH CV ECHO MEAS - BZI_METRIC_HEIGHT: 160 CM
BH CV ECHO MEAS - BZI_METRIC_WEIGHT: 63.5 KG
BH CV ECHO MEAS - EDV(CUBED): 115.5 ML
BH CV ECHO MEAS - EDV(MOD-SP2): 97 ML
BH CV ECHO MEAS - EDV(MOD-SP4): 111 ML
BH CV ECHO MEAS - EDV(TEICH): 111.2 ML
BH CV ECHO MEAS - EF(CUBED): 74 %
BH CV ECHO MEAS - EF(MOD-BP): 61 %
BH CV ECHO MEAS - EF(MOD-SP2): 60.8 %
BH CV ECHO MEAS - EF(MOD-SP4): 60.4 %
BH CV ECHO MEAS - EF(TEICH): 65.6 %
BH CV ECHO MEAS - EF{MOD-BP}: 61 %
BH CV ECHO MEAS - ESV(CUBED): 30.1 ML
BH CV ECHO MEAS - ESV(MOD-SP2): 38 ML
BH CV ECHO MEAS - ESV(MOD-SP4): 44 ML
BH CV ECHO MEAS - ESV(TEICH): 38.2 ML
BH CV ECHO MEAS - FS: 36.1 %
BH CV ECHO MEAS - IVS/LVPW: 0.92
BH CV ECHO MEAS - IVSD: 0.67 CM
BH CV ECHO MEAS - LA DIMENSION: 3.2 CM
BH CV ECHO MEAS - LV DIASTOLIC VOL/BSA (35-75): 66.8 ML/M^2
BH CV ECHO MEAS - LV MASS(C)D: 110.2 GRAMS
BH CV ECHO MEAS - LV MASS(C)DI: 66.3 GRAMS/M^2
BH CV ECHO MEAS - LV MAX PG: 5.3 MMHG
BH CV ECHO MEAS - LV MEAN PG: 3 MMHG
BH CV ECHO MEAS - LV SYSTOLIC VOL/BSA (12-30): 26.5 ML/M^2
BH CV ECHO MEAS - LV V1 MAX: 115 CM/SEC
BH CV ECHO MEAS - LV V1 MEAN: 73 CM/SEC
BH CV ECHO MEAS - LV V1 VTI: 20.4 CM
BH CV ECHO MEAS - LVIDD: 4.9 CM
BH CV ECHO MEAS - LVIDS: 3.1 CM
BH CV ECHO MEAS - LVLD AP2: 7.1 CM
BH CV ECHO MEAS - LVLD AP4: 7.8 CM
BH CV ECHO MEAS - LVLS AP2: 5.6 CM
BH CV ECHO MEAS - LVLS AP4: 6 CM
BH CV ECHO MEAS - LVOT AREA (M): 3.5 CM^2
BH CV ECHO MEAS - LVOT AREA: 3.5 CM^2
BH CV ECHO MEAS - LVOT DIAM: 2.1 CM
BH CV ECHO MEAS - LVPWD: 0.73 CM
BH CV ECHO MEAS - MV A MAX VEL: 80.5 CM/SEC
BH CV ECHO MEAS - MV DEC TIME: 0.28 SEC
BH CV ECHO MEAS - MV E MAX VEL: 101 CM/SEC
BH CV ECHO MEAS - MV E/A: 1.3
BH CV ECHO MEAS - PA ACC SLOPE: 781 CM/SEC^2
BH CV ECHO MEAS - PA ACC TIME: 0.15 SEC
BH CV ECHO MEAS - PA PR(ACCEL): 12.4 MMHG
BH CV ECHO MEAS - RAP SYSTOLE: 3 MMHG
BH CV ECHO MEAS - RVSP: 32.4 MMHG
BH CV ECHO MEAS - SI(CUBED): 51.4 ML/M^2
BH CV ECHO MEAS - SI(LVOT): 42.5 ML/M^2
BH CV ECHO MEAS - SI(MOD-SP2): 35.5 ML/M^2
BH CV ECHO MEAS - SI(MOD-SP4): 40.3 ML/M^2
BH CV ECHO MEAS - SI(TEICH): 43.9 ML/M^2
BH CV ECHO MEAS - SV(CUBED): 85.4 ML
BH CV ECHO MEAS - SV(LVOT): 70.7 ML
BH CV ECHO MEAS - SV(MOD-SP2): 59 ML
BH CV ECHO MEAS - SV(MOD-SP4): 67 ML
BH CV ECHO MEAS - SV(TEICH): 73 ML
BH CV ECHO MEAS - TAPSE (>1.6): 2.7 CM2
BH CV ECHO MEAS - TR MAX PG: 29.4 MMHG
BH CV ECHO MEAS - TR MAX VEL: 271 CM/SEC
BH CV XLRA - RV BASE: 3.3 CM
BH CV XLRA - RV LENGTH: 6 CM
BH CV XLRA - RV MID: 2.6 CM
LEFT ATRIUM VOLUME INDEX: 32 ML/M2
LV EF 2D ECHO EST: 60 %

## 2021-07-22 ENCOUNTER — TELEPHONE (OUTPATIENT)
Dept: OBSTETRICS AND GYNECOLOGY | Facility: CLINIC | Age: 31
End: 2021-07-22

## 2021-07-22 RX ORDER — PROMETHAZINE HYDROCHLORIDE 12.5 MG/1
12.5 TABLET ORAL EVERY 6 HOURS PRN
Qty: 30 TABLET | Refills: 0 | Status: SHIPPED | OUTPATIENT
Start: 2021-07-22 | End: 2021-08-03

## 2021-08-03 ENCOUNTER — INITIAL PRENATAL (OUTPATIENT)
Dept: OBSTETRICS AND GYNECOLOGY | Facility: CLINIC | Age: 31
End: 2021-08-03

## 2021-08-03 VITALS — DIASTOLIC BLOOD PRESSURE: 82 MMHG | WEIGHT: 139 LBS | BODY MASS INDEX: 24.62 KG/M2 | SYSTOLIC BLOOD PRESSURE: 144 MMHG

## 2021-08-03 DIAGNOSIS — R03.0 ELEVATED BLOOD PRESSURE READING: ICD-10-CM

## 2021-08-03 DIAGNOSIS — O36.80X0 ENCOUNTER TO DETERMINE FETAL VIABILITY OF PREGNANCY, SINGLE OR UNSPECIFIED FETUS: ICD-10-CM

## 2021-08-03 DIAGNOSIS — Z34.91 PRENATAL CARE IN FIRST TRIMESTER: Primary | ICD-10-CM

## 2021-08-03 PROCEDURE — 0501F PRENATAL FLOW SHEET: CPT | Performed by: OBSTETRICS & GYNECOLOGY

## 2021-08-04 LAB
ABO GROUP BLD: ABNORMAL
BASOPHILS # BLD AUTO: 0 X10E3/UL (ref 0–0.2)
BASOPHILS NFR BLD AUTO: 0 %
BLD GP AB SCN SERPL QL: NEGATIVE
EOSINOPHIL # BLD AUTO: 0.1 X10E3/UL (ref 0–0.4)
EOSINOPHIL NFR BLD AUTO: 1 %
ERYTHROCYTE [DISTWIDTH] IN BLOOD BY AUTOMATED COUNT: 12.3 % (ref 11.7–15.4)
HBV SURFACE AG SERPL QL IA: NEGATIVE
HCT VFR BLD AUTO: 35.6 % (ref 34–46.6)
HCV AB S/CO SERPL IA: <0.1 S/CO RATIO (ref 0–0.9)
HGB BLD-MCNC: 12 G/DL (ref 11.1–15.9)
HIV 1+2 AB+HIV1 P24 AG SERPL QL IA: NON REACTIVE
IMM GRANULOCYTES # BLD AUTO: 0.1 X10E3/UL (ref 0–0.1)
IMM GRANULOCYTES NFR BLD AUTO: 1 %
LYMPHOCYTES # BLD AUTO: 1.2 X10E3/UL (ref 0.7–3.1)
LYMPHOCYTES NFR BLD AUTO: 13 %
MCH RBC QN AUTO: 30.8 PG (ref 26.6–33)
MCHC RBC AUTO-ENTMCNC: 33.7 G/DL (ref 31.5–35.7)
MCV RBC AUTO: 92 FL (ref 79–97)
MONOCYTES # BLD AUTO: 0.6 X10E3/UL (ref 0.1–0.9)
MONOCYTES NFR BLD AUTO: 6 %
NEUTROPHILS # BLD AUTO: 7.6 X10E3/UL (ref 1.4–7)
NEUTROPHILS NFR BLD AUTO: 79 %
PLATELET # BLD AUTO: 358 X10E3/UL (ref 150–450)
RBC # BLD AUTO: 3.89 X10E6/UL (ref 3.77–5.28)
RH BLD: POSITIVE
RPR SER QL: NON REACTIVE
RUBV IGG SERPL IA-ACNC: 1.63 INDEX
WBC # BLD AUTO: 9.6 X10E3/UL (ref 3.4–10.8)

## 2021-08-05 LAB
A VAGINAE DNA VAG QL NAA+PROBE: NORMAL SCORE
BVAB2 DNA VAG QL NAA+PROBE: NORMAL SCORE
C ALBICANS DNA VAG QL NAA+PROBE: NEGATIVE
C GLABRATA DNA VAG QL NAA+PROBE: NEGATIVE
C TRACH DNA VAG QL NAA+PROBE: NEGATIVE
MEGA1 DNA VAG QL NAA+PROBE: NORMAL SCORE
N GONORRHOEA DNA VAG QL NAA+PROBE: NEGATIVE
T VAGINALIS DNA VAG QL NAA+PROBE: NEGATIVE

## 2021-08-11 NOTE — PROGRESS NOTES
New Pregnancy Visit    Subjective   Chief Complaint   Patient presents with   • Initial Prenatal Visit     LMP 21, TVS done today 8w2d, last pap 20 WNL. No complaints       Karmen Tracey is a 30 y.o. year old .  Patient's last menstrual period was 2021.  She presents to initiate prenatal care with our group today.     No complaints. Previous pregnancy complicated by gHTN. No history of hypertension outside of that pregnancy. She was induced at 38 weeks - vaginal birth. Pelvis proven to 6 pounds 5 ounces.    Social History    Tobacco Use      Smoking status: Never Smoker      Smokeless tobacco: Never Used      Current Outpatient Medications on File Prior to Visit   Medication Sig Dispense Refill   • ASPIRIN 81 PO Take  by mouth.     • cetirizine (ZYRTEC ALLERGY) 10 MG tablet Take  by mouth daily.     • Prenatal Vit-Fe Fumarate-FA (PRENATAL VITAMIN 27-0.8) 27-0.8 MG tablet tablet Take  by mouth Daily.       No current facility-administered medications on file prior to visit.          Objective   /82   Wt 63 kg (139 lb)   LMP 2021   BMI 24.62 kg/m²   Physical Exam:  Normal, gestational age-appropriate exam today        Medical Decision Making:    Lab Review:   No data reviewed    Note Review:  I reviewed her H&P, delivery note and discharge summary from her delivery in 2018.    Imaging Review:  Pelvic ultrasound report   IUP measuring 8+2 weeks with appropriate fetal cardiac activity.  CLARE is set at 2022 (sure LMP).  The cervix and bilateral ovaries are normal in appearance. No free fluid in the cul-de-sac.  Assessment   1. IUP at 8+6 weeks  2. Supervision of high risk pregnancy   3. Elevated blood pressure  4. History of gestational hypertension     Plan    1. The problem list for pregnancy was initiated today  2. Tests/Orders/Rx for today:  Orders Placed This Encounter   Procedures   • US Ob Transvaginal     Order Specific Question:   Reason for Exam:     Answer:    NOB, dates, viability   • OB Panel With HIV     Order Specific Question:   Release to patient     Answer:   Immediate       Medication Management: Aspirin 81 mg daily    3. Testing for GC / Chlamydia / trichomonas was done today  4. Genetic testing reviewed: she will consider the information and make a decision at a later date.  5. Information reviewed: exercise in pregnancy, nutrition in pregnancy, weight gain in pregnancy, work and travel restrictions during pregnancy, list of OTC medications acceptable in pregnancy and call coverage groups   6. Start aspirin 81 mg daily.  7. If blood pressure remains elevated at her next visit, baseline lab testing will be ordered. We discussed the possibility of antihypertensives. She will check blood pressures at home in the interim.    Follow up: 4 week(s)    Taco Velez MD  Obstetrics and Gynecology  [facility]

## 2021-08-30 ENCOUNTER — ROUTINE PRENATAL (OUTPATIENT)
Dept: OBSTETRICS AND GYNECOLOGY | Facility: CLINIC | Age: 31
End: 2021-08-30

## 2021-08-30 VITALS — BODY MASS INDEX: 25.37 KG/M2 | DIASTOLIC BLOOD PRESSURE: 84 MMHG | WEIGHT: 143.2 LBS | SYSTOLIC BLOOD PRESSURE: 128 MMHG

## 2021-08-30 DIAGNOSIS — O36.8390 ULTRASOUND SCAN DONE FOR INABILITY TO HEAR FETAL HEART TONES: Primary | ICD-10-CM

## 2021-08-30 PROCEDURE — 0502F SUBSEQUENT PRENATAL CARE: CPT | Performed by: NURSE PRACTITIONER

## 2021-08-30 NOTE — PROGRESS NOTES
[id number]  Chief Complaint   Patient presents with   • Routine Prenatal Visit     Patient states she is doing well        HPI  Karmen is a  currently at 12w5d who today reports the following:   Check BP's check at home - always normal  110/71   No c/o   Tolerating PNV     EXAM  /84   Wt 65 kg (143 lb 3.2 oz)   LMP 2021   BMI 25.37 kg/m²  -See Prenatal Assessment  General Appearance:  Pleasant  Lungs: Breathing unlabored  Abdomen:  See flow sheet for Fundal ht, FM, FHT's  LE: Neg edema  V/E: Not performed     Social History     Tobacco Use   • Smoking status: Never Smoker   • Smokeless tobacco: Never Used   Vaping Use   • Vaping Use: Never used   Substance Use Topics   • Alcohol use: No     Comment: Social alcohol use   • Drug use: No         Lab Results   Component Value Date    ABO A 2021    RH Positive 2021    ABSCRN Negative 2021       MDM  Impression: Supervision of high risk pregnancy   Borderline CHTN in pregnancy    Tests done today: U/S for FHR   Option for NIP's - declined    Topics discussed: Adequate rest and fluids   Blood Pressures at home   Written info optional/provided:  -COVID vaccine in pregnancy   Tests next visit: Option for AFP

## 2021-09-27 ENCOUNTER — ROUTINE PRENATAL (OUTPATIENT)
Dept: OBSTETRICS AND GYNECOLOGY | Facility: CLINIC | Age: 31
End: 2021-09-27

## 2021-09-27 VITALS — WEIGHT: 146.5 LBS | SYSTOLIC BLOOD PRESSURE: 120 MMHG | DIASTOLIC BLOOD PRESSURE: 76 MMHG | BODY MASS INDEX: 25.95 KG/M2

## 2021-09-27 DIAGNOSIS — O09.299 HX OF PREECLAMPSIA, PRIOR PREGNANCY, CURRENTLY PREGNANT: ICD-10-CM

## 2021-09-27 DIAGNOSIS — O09.92 ENCOUNTER FOR SUPERVISION OF HIGH RISK PREGNANCY IN SECOND TRIMESTER, ANTEPARTUM: Primary | ICD-10-CM

## 2021-09-27 DIAGNOSIS — O10.919 HTN IN PREGNANCY, CHRONIC: ICD-10-CM

## 2021-09-27 PROCEDURE — 0502F SUBSEQUENT PRENATAL CARE: CPT | Performed by: OBSTETRICS & GYNECOLOGY

## 2021-09-27 NOTE — PROGRESS NOTES
Chief Complaint  Routine Prenatal Visit (Patient states she is doing well.)    History of Present Illness:  Karmen is a  currently at 16w5d who presents today with no complaints.  Patient does report positive fetal movement.  Patient has continued checking her blood pressures at home.  Patient reports her maximum blood pressure has been 130/80.  Patient denies any headaches or visual disturbances.  Patient does have a history of previous preeclampsia in her first pregnancy.  Patient is taking baby aspirin.  Patient has had nausea and emesis.  Patient reports having 6-7 episodes with this pregnancy.  Patient declines any genetic screening.    Exam:  Vitals:  See prenatal flowsheet as noted and reviewed  General: Alert, cooperative, and does not appear in any distress  Abdomen:   See prenatal flowsheet as noted and reviewed    Uterus gravid, non-tender; no palpable masses    No guarding or rebound tenderness  Pelvic:  See prenatal flowsheet as noted and reviewed  Ext:  See prenatal flowsheet as noted and reviewed    Moves extremities well, no cyanosis and no redness  Urine:  See prenatal flowsheet as noted and reviewed    Data Review:  The following data was reviewed by: Velma Sosa MD on 2021:  Prenatal Labs:  Lab Results   Component Value Date    HGB 12.0 2021    RUBELLAABIGG 1.63 2021    HEPBSAG Negative 2021    ABO A 2021    RH Positive 2021    ABSCRN Negative 2021    [id number] Non Reactive 2021    HEPCVIRUSABY <0.1 2021    STREPGPB Positive (A) 10/25/2018    URINECX Final report 2018       No visits with results within 1 Month(s) from this visit.   Latest known visit with results is:   Orders Only on 2021   Component Date Value   • Atopobium Vaginae 2021 Low - 0    • BVAB 2 2021 Low - 0    • Megasphaera 1 2021 Low - 0    • Chari Albicans, ADELIA 2021 Negative    • Chari Glabrata, ADELIA 2021 Negative    •  Trichomonas vaginosis 08/03/2021 Negative    • Chlamydia trachomatis, N* 08/03/2021 Negative    • Neisseria gonorrhoeae, N* 08/03/2021 Negative      Imaging:  US Ob Limited 1 + Fetuses  Active fetus.  Fetal heart tones 162 bpm.    Medical Records:  None    Assessment and Plan:  Problem List Items Addressed This Visit        Cardiac and Vasculature    Chronic HTN  Patient with history of chronic hypertension.  Patient is currently not on any medications.  Patient is to continue monitoring as instructed.  Patient is to continue baby aspirin as well.              Other Visit Diagnoses     Encounter for supervision of high risk pregnancy in second trimester, antepartum    -  Primary  Topics discussed:     ab precautions  genetic screening - Today we discussed genetic testing.  She is aware that the MSAFP-4 and NIPT - [id number] is a screening test.  A screening test is not a diagnostic test.  This means that a negative test does not guarantee an unaffected fetus and a positive test does not mean the fetus has the condition for which the test is being performed.  If the test returns positive, a diagnostic test should be consider to determine if the fetus in fact has the condition.  After considering the options previously presented, she is not interested in having genetic testing performed.  kick counts and fetal movement  PIH precautions  Anatomic scan next visit as noted.    Hx of preeclampsia, prior pregnancy, currently pregnant      Patient is to continue her baby aspirin as discussed.  Patient is to continue monitoring blood pressures as noted.        Follow Up/Instructions:  Follow up as scheduled.  Patient was given instructions and counseling regarding her condition or for health maintenance advice. Please see specific information pulled into the AVS if appropriate.     Note: Speech recognition transcription software may have been used to dictate portions of this document.  An attempt at proofreading has been  made though minor errors in transcription may still be present.    This note was electronically signed.  Velma Sosa M.D.

## 2021-10-06 ENCOUNTER — ROUTINE PRENATAL (OUTPATIENT)
Dept: OBSTETRICS AND GYNECOLOGY | Facility: CLINIC | Age: 31
End: 2021-10-06

## 2021-10-06 VITALS — WEIGHT: 146 LBS | DIASTOLIC BLOOD PRESSURE: 76 MMHG | BODY MASS INDEX: 25.86 KG/M2 | SYSTOLIC BLOOD PRESSURE: 132 MMHG

## 2021-10-06 DIAGNOSIS — O09.299 HX OF PREECLAMPSIA, PRIOR PREGNANCY, CURRENTLY PREGNANT: Primary | ICD-10-CM

## 2021-10-06 DIAGNOSIS — O09.92 ENCOUNTER FOR SUPERVISION OF HIGH RISK PREGNANCY IN SECOND TRIMESTER, ANTEPARTUM: ICD-10-CM

## 2021-10-06 DIAGNOSIS — O10.919 HTN IN PREGNANCY, CHRONIC: ICD-10-CM

## 2021-10-06 PROCEDURE — 0502F SUBSEQUENT PRENATAL CARE: CPT | Performed by: OBSTETRICS & GYNECOLOGY

## 2021-10-06 NOTE — PROGRESS NOTES
Chief Complaint  Routine Prenatal Visit (Anatomy scan today, no complaints. )    History of Present Illness:  Karmen is a  currently at 18w0d who presents today with no complaints.  Patient does report positive fetal movement.  Patient denies any cramping or contractions.  Patient does report having episodes of nausea.  Patient has had occasional reflux.  Patient does report having improvement in her symptoms.    Exam:  Vitals:  See prenatal flowsheet as noted and reviewed  General: Alert, cooperative, and does not appear in any distress  Abdomen:   See prenatal flowsheet as noted and reviewed    Uterus gravid, non-tender; no palpable masses    No guarding or rebound tenderness  Pelvic:  See prenatal flowsheet as noted and reviewed  Ext:  See prenatal flowsheet as noted and reviewed    Moves extremities well, no cyanosis and no redness  Urine:  See prenatal flowsheet as noted and reviewed    Data Review:  The following data was reviewed by: Velma Sosa MD on 10/06/2021:  Prenatal Labs:  Lab Results   Component Value Date    HGB 12.0 2021    RUBELLAABIGG 1.63 2021    HEPBSAG Negative 2021    ABO A 2021    RH Positive 2021    ABSCRN Negative 2021    [id number] Non Reactive 2021    HEPCVIRUSABY <0.1 2021    STREPGPB Positive (A) 10/25/2018    URINECX Final report 2018       No visits with results within 1 Month(s) from this visit.   Latest known visit with results is:   Orders Only on 2021   Component Date Value   • Atopobium Vaginae 2021 Low - 0    • BVAB 2 2021 Low - 0    • Megasphaera 1 2021 Low - 0    • Chari Albicans, ADELIA 2021 Negative    • Chari Glabrata, ADELIA 2021 Negative    • Trichomonas vaginosis 2021 Negative    • Chlamydia trachomatis, N* 2021 Negative    • Neisseria gonorrhoeae, N* 2021 Negative      Imaging:  US Ob Limited 1 + Fetuses  Active fetus.  Fetal heart tones 162  bpm.    Anatomic scan is obtained today.  The pregnancy is measuring 17 weeks and 3 days.  There are no anatomic abnormalities noted.  The infant is in the breech presentation.  Placenta is noted to be anterior.    Medical Records:  None    Assessment and Plan:  Problem List Items Addressed This Visit     None      Visit Diagnoses     Hx of preeclampsia, prior pregnancy, currently pregnant     Patient is to continue her baby aspirin as previously given.    Encounter for supervision of high risk pregnancy in second trimester, antepartum      Topics discussed:     ab precautions  genetic screening - Today we discussed genetic testing.  She is aware that the MSAFP-4 and NIPT - [id number] is a screening test.  A screening test is not a diagnostic test.  This means that a negative test does not guarantee an unaffected fetus and a positive test does not mean the fetus has the condition for which the test is being performed.  If the test returns positive, a diagnostic test should be consider to determine if the fetus in fact has the condition.  After considering the options previously presented, she is not interested in having genetic testing performed.  kick counts and fetal movement  PIH precautions  Anatomic scan is obtained today as noted.  Patient has been informed regarding those findings.  Patient declines any genetic screening as noted.    HTN in pregnancy, chronic      Patient is to continue monitoring her blood pressure levels as noted.  Instructions and precautions were given.  Patient is to continue her baby aspirin.        Follow Up/Instructions:  Follow up as scheduled.  Patient was given instructions and counseling regarding her condition or for health maintenance advice. Please see specific information pulled into the AVS if appropriate.     Note: Speech recognition transcription software may have been used to dictate portions of this document.  An attempt at proofreading has been made though minor errors  in transcription may still be present.    This note was electronically signed.  Velma Sosa M.D.

## 2021-11-03 ENCOUNTER — ROUTINE PRENATAL (OUTPATIENT)
Dept: OBSTETRICS AND GYNECOLOGY | Facility: CLINIC | Age: 31
End: 2021-11-03

## 2021-11-03 VITALS — SYSTOLIC BLOOD PRESSURE: 130 MMHG | WEIGHT: 148 LBS | DIASTOLIC BLOOD PRESSURE: 84 MMHG | BODY MASS INDEX: 26.22 KG/M2

## 2021-11-03 DIAGNOSIS — Z34.92 SECOND TRIMESTER PREGNANCY: Primary | ICD-10-CM

## 2021-11-03 PROCEDURE — 0502F SUBSEQUENT PRENATAL CARE: CPT | Performed by: OBSTETRICS & GYNECOLOGY

## 2021-11-03 NOTE — PROGRESS NOTES
Chief Complaint   Patient presents with   • Routine Prenatal Visit     sciatic pain         HPI:   , 22w0d gestation reports doing well    ROS:  See Prenatal Episode/Flowsheet  /84   Wt 67.1 kg (148 lb)   LMP 2021   BMI 26.22 kg/m²      EXAM:  EXTREMITIES:  No swelling-See Prenatal Episode/Flowsheet    ABDOMEN:  FHTs/Movement noted-See Prenatal Episode/Flowsheet    URINE GLUCOSE/PROTEIN:  See Prenatal Episode/Flowsheet    PELVIC EXAM:  See Prenatal Episode/Flowsheet  CV:  Lungs:  GYN:    MDM:    Lab Results   Component Value Date    HGB 12.0 2021    RUBELLAABIGG 1.63 2021    HEPBSAG Negative 2021    ABO A 2021    RH Positive 2021    ABSCRN Negative 2021    [id number] Non Reactive 2021    HEPCVIRUSABY <0.1 2021    STREPGPB Positive (A) 10/25/2018    URINECX Final report 2018       U/S:    1. IUP 22w0d  2. Routine care   3. Gluocla next time

## 2021-11-29 ENCOUNTER — ROUTINE PRENATAL (OUTPATIENT)
Dept: OBSTETRICS AND GYNECOLOGY | Facility: CLINIC | Age: 31
End: 2021-11-29

## 2021-11-29 VITALS — DIASTOLIC BLOOD PRESSURE: 70 MMHG | BODY MASS INDEX: 26.75 KG/M2 | SYSTOLIC BLOOD PRESSURE: 122 MMHG | WEIGHT: 151 LBS

## 2021-11-29 DIAGNOSIS — O26.899 CRAMPING AFFECTING PREGNANCY, ANTEPARTUM: ICD-10-CM

## 2021-11-29 DIAGNOSIS — R10.9 CRAMPING AFFECTING PREGNANCY, ANTEPARTUM: ICD-10-CM

## 2021-11-29 DIAGNOSIS — O09.299 HX OF PREECLAMPSIA, PRIOR PREGNANCY, CURRENTLY PREGNANT: ICD-10-CM

## 2021-11-29 DIAGNOSIS — R32 URINARY INCONTINENCE, UNSPECIFIED TYPE: ICD-10-CM

## 2021-11-29 DIAGNOSIS — O09.92 ENCOUNTER FOR SUPERVISION OF HIGH RISK PREGNANCY IN SECOND TRIMESTER, ANTEPARTUM: Primary | ICD-10-CM

## 2021-11-29 LAB
ERYTHROCYTE [DISTWIDTH] IN BLOOD BY AUTOMATED COUNT: 12.2 % (ref 12.3–15.4)
GLUCOSE 1H P 50 G GLC PO SERPL-MCNC: 99 MG/DL (ref 65–139)
HCT VFR BLD AUTO: 32.8 % (ref 34–46.6)
HGB BLD-MCNC: 11.1 G/DL (ref 12–15.9)
MCH RBC QN AUTO: 31.4 PG (ref 26.6–33)
MCHC RBC AUTO-ENTMCNC: 33.8 G/DL (ref 31.5–35.7)
MCV RBC AUTO: 92.7 FL (ref 79–97)
PLATELET # BLD AUTO: 290 10*3/MM3 (ref 140–450)
RBC # BLD AUTO: 3.54 10*6/MM3 (ref 3.77–5.28)
WBC # BLD AUTO: 11.53 10*3/MM3 (ref 3.4–10.8)

## 2021-11-29 PROCEDURE — 0502F SUBSEQUENT PRENATAL CARE: CPT | Performed by: OBSTETRICS & GYNECOLOGY

## 2021-11-29 NOTE — PROGRESS NOTES
Chief Complaint  Routine Prenatal Visit (Patient complains of cramping in lower stomach.)    History of Present Illness:  Karmen is a  currently at 25w5d who presents today with complaints of cramping abdomen.  Patient reports having 2 episodes of cramping over the last week.  Patient reports it is continuous lasting for several hours.  Patient denies any vaginal bleeding or spotting.  Patient also reports during this time having episodes of leaking of urine with ambulation.  Patient reports the leaking will resolve.  Patient denies any vaginal bleeding or spotting.  Patient does report good fetal movement.  Patient is taking her prenatal vitamins as well as baby aspirin.    Exam:  Vitals:  See prenatal flowsheet as noted and reviewed  General: Alert, cooperative, and does not appear in any distress  Abdomen:   See prenatal flowsheet as noted and reviewed    Uterus gravid, non-tender; no palpable masses    No guarding or rebound tenderness  Pelvic:  See prenatal flowsheet as noted and reviewed  Ext:  See prenatal flowsheet as noted and reviewed    Moves extremities well, no cyanosis and no redness  Urine:  See prenatal flowsheet as noted and reviewed    Data Review:  The following data was reviewed by: Velma Sosa MD on 2021:  Prenatal Labs:  Lab Results   Component Value Date    HGB 12.0 2021    RUBELLAABIGG 1.63 2021    HEPBSAG Negative 2021    ABO A 2021    RH Positive 2021    ABSCRN Negative 2021    [id number] Non Reactive 2021    HEPCVIRUSABY <0.1 2021    STREPGPB Positive (A) 10/25/2018    URINECX Final report 2018       No visits with results within 1 Month(s) from this visit.   Latest known visit with results is:   Orders Only on 2021   Component Date Value   • Atopobium Vaginae 2021 Low - 0    • BVAB 2 2021 Low - 0    • Megasphaera 1 2021 Low - 0    • Chari Albicans, ADELIA 2021 Negative    • Candida Glabrata,  ADELIA 2021 Negative    • Trichomonas vaginosis 2021 Negative    • Chlamydia trachomatis, N* 2021 Negative    • Neisseria gonorrhoeae, N* 2021 Negative      Imaging:  US Ob Transvaginal  Karmen Tracey  : 1990  MRN: [id number]  Date: 2021    Reason for exam/History:  cramping    Ultrasound images are reviewed.  There is noted to be a viable   intrauterine pregnancy.   The fetal heart rate was normal.   The fetus was   noted to be active.  The cervical length was noted to be 4.89 cms.    The exam limitations noted:  none    See ultrasound report for measurements and structures identified.    Velma Sosa MD, [facility]  OB GYN [place]    Medical Records:  None    Assessment and Plan:  Problem List Items Addressed This Visit     None      Visit Diagnoses     Encounter for supervision of high risk pregnancy in second trimester, antepartum    -  Primary  Topics discussed:     kick counts and fetal movement  PIH precautions   labor signs and symptoms  CBC and Glucola today.    Relevant Orders    CBC (No Diff)    Gestational Screen 1 Hr ([facility])    Cramping affecting pregnancy, antepartum      Cervical length was obtained as noted.  Patient has been informed regarding those findings.   labor precautions and instructions have been given.    Relevant Orders    US Ob Transvaginal (Completed)    Hx of preeclampsia, prior pregnancy, currently pregnant      Patient is to continue her baby aspirin as discussed.    Urinary incontinence, unspecified type      Send urine for clean-catch UA culture and sensitivity.  Instructions and precautions are given.  Patient is to call for the results.        Follow Up/Instructions:  Follow up as scheduled.  Patient was given instructions and counseling regarding her condition or for health maintenance advice. Please see specific information pulled into the AVS if appropriate.     Note: Speech recognition transcription  software may have been used to dictate portions of this document.  An attempt at proofreading has been made though minor errors in transcription may still be present.    This note was electronically signed.  Velma Sosa M.D.

## 2021-11-30 RX ORDER — FERROUS SULFATE 325(65) MG
325 TABLET ORAL
Qty: 60 TABLET | Refills: 3 | Status: SHIPPED | OUTPATIENT
Start: 2021-11-30

## 2021-12-01 LAB
APPEARANCE UR: CLEAR
BACTERIA #/AREA URNS HPF: ABNORMAL /HPF
BACTERIA UR CULT: NO GROWTH
BACTERIA UR CULT: NORMAL
BILIRUB UR QL STRIP: NEGATIVE
CASTS URNS MICRO: ABNORMAL
COLOR UR: YELLOW
EPI CELLS #/AREA URNS HPF: ABNORMAL /HPF
GLUCOSE UR QL: NEGATIVE
HGB UR QL STRIP: NEGATIVE
KETONES UR QL STRIP: NEGATIVE
LEUKOCYTE ESTERASE UR QL STRIP: ABNORMAL
NITRITE UR QL STRIP: NEGATIVE
PH UR STRIP: 6.5 [PH] (ref 5–8)
PROT UR QL STRIP: NEGATIVE
RBC #/AREA URNS HPF: ABNORMAL /HPF
SP GR UR: 1.01 (ref 1–1.03)
UROBILINOGEN UR STRIP-MCNC: ABNORMAL MG/DL
WBC #/AREA URNS HPF: ABNORMAL /HPF

## 2021-12-13 ENCOUNTER — ROUTINE PRENATAL (OUTPATIENT)
Dept: OBSTETRICS AND GYNECOLOGY | Facility: CLINIC | Age: 31
End: 2021-12-13

## 2021-12-13 VITALS — DIASTOLIC BLOOD PRESSURE: 68 MMHG | WEIGHT: 154 LBS | BODY MASS INDEX: 27.28 KG/M2 | SYSTOLIC BLOOD PRESSURE: 126 MMHG

## 2021-12-13 DIAGNOSIS — O09.93 ENCOUNTER FOR SUPERVISION OF HIGH RISK PREGNANCY IN THIRD TRIMESTER, ANTEPARTUM: Primary | ICD-10-CM

## 2021-12-13 DIAGNOSIS — D50.9 IRON DEFICIENCY ANEMIA DURING PREGNANCY: ICD-10-CM

## 2021-12-13 DIAGNOSIS — O99.019 IRON DEFICIENCY ANEMIA DURING PREGNANCY: ICD-10-CM

## 2021-12-13 DIAGNOSIS — R10.9 CRAMPING AFFECTING PREGNANCY, ANTEPARTUM: ICD-10-CM

## 2021-12-13 DIAGNOSIS — O26.899 CRAMPING AFFECTING PREGNANCY, ANTEPARTUM: ICD-10-CM

## 2021-12-13 DIAGNOSIS — O09.299 HX OF PREECLAMPSIA, PRIOR PREGNANCY, CURRENTLY PREGNANT: ICD-10-CM

## 2021-12-13 PROCEDURE — 0502F SUBSEQUENT PRENATAL CARE: CPT | Performed by: OBSTETRICS & GYNECOLOGY

## 2021-12-14 NOTE — PROGRESS NOTES
Chief Complaint  Routine Prenatal Visit (No complaints. )    History of Present Illness:  Karmen is a  currently at 27w6d who presents today with no complaints.  Patient reports her cramping has improved.  Patient reports good fetal movement.    Exam:  Vitals:  See prenatal flowsheet as noted and reviewed  General: Alert, cooperative, and does not appear in any distress  Abdomen:   See prenatal flowsheet as noted and reviewed    Uterus gravid, non-tender; no palpable masses    No guarding or rebound tenderness  Pelvic:  See prenatal flowsheet as noted and reviewed  Ext:  See prenatal flowsheet as noted and reviewed    Moves extremities well, no cyanosis and no redness  Urine:  See prenatal flowsheet as noted and reviewed    Data Review:  The following data was reviewed by: Velma Sosa MD on 2021:  Prenatal Labs:  Lab Results   Component Value Date    HGB 11.1 (L) 2021    RUBELLAABIGG 1.63 2021    HEPBSAG Negative 2021    ABO A 2021    RH Positive 2021    ABSCRN Negative 2021    [id number] Non Reactive 2021    HEPCVIRUSABY <0.1 2021    STREPGPB Positive (A) 10/25/2018    URINECX Final report 2021       Routine Prenatal on 2021   Component Date Value   • WBC 2021 11.53*   • RBC 2021 3.54*   • Hemoglobin 2021 11.1*   • Hematocrit 2021 32.8*   • MCV 2021 92.7    • MCH 2021 31.4    • MCHC 2021 33.8    • RDW 2021 12.2*   • Platelets 2021 290    • Gestational Diabetes Scr* 2021 99    • Urine Culture 2021 Final report    • Result 1 2021 No growth    • Specific Gravity, UA 2021 1.012    • pH, UA 2021 6.5    • Color, UA 2021 Yellow    • Appearance, UA 2021 Clear    • Leukocytes, UA 2021 Trace*   • Protein 2021 Negative    • Glucose, UA 2021 Negative    • Ketones 2021 Negative    • Blood, UA 2021 Negative    • Bilirubin, UA  2021 Negative    • Urobilinogen, UA 2021 Comment    • Nitrite, UA 2021 Negative    • WBC, UA 2021 0-2    • RBC, UA 2021 0-2    • Epithelial Cells (non re* 2021 7-12*   • Cast Type 2021 Comment    • Bacteria, UA 2021 Comment      Imaging:  US Ob Transvaginal  Karmen Tracey  : 1990  MRN: [id number]  Date: 2021    Reason for exam/History:  cramping    Ultrasound images are reviewed.  There is noted to be a viable   intrauterine pregnancy.   The fetal heart rate was normal.   The fetus was   noted to be active.  The cervical length was noted to be 4.89 cms.    The exam limitations noted:  none    See ultrasound report for measurements and structures identified.    Velma Sosa MD, [facility]  OB GYN [place]    Medical Records:  None    Assessment and Plan:  Problem List Items Addressed This Visit     None      Visit Diagnoses     Encounter for supervision of high risk pregnancy in third trimester, antepartum    -  Primary  Topics discussed:     iron supplementation  kick counts and fetal movement  PIH precautions   labor signs and symptoms      Hx of preeclampsia, prior pregnancy, currently pregnant      Instructions and precautions are given.    Cramping affecting pregnancy, antepartum       labor precautions and instructions have been given.    Iron deficiency anemia during pregnancy      Patient to continue her iron supplements as previously given.        Follow Up/Instructions:  Follow up as scheduled.  Patient was given instructions and counseling regarding her condition or for health maintenance advice. Please see specific information pulled into the AVS if appropriate.     Note: Speech recognition transcription software may have been used to dictate portions of this document.  An attempt at proofreading has been made though minor errors in transcription may still be present.    This note was electronically  cecil Sosa M.D.

## 2021-12-27 ENCOUNTER — ROUTINE PRENATAL (OUTPATIENT)
Dept: OBSTETRICS AND GYNECOLOGY | Facility: CLINIC | Age: 31
End: 2021-12-27

## 2021-12-27 VITALS — WEIGHT: 155 LBS | DIASTOLIC BLOOD PRESSURE: 78 MMHG | SYSTOLIC BLOOD PRESSURE: 124 MMHG | BODY MASS INDEX: 27.46 KG/M2

## 2021-12-27 DIAGNOSIS — Z34.93 THIRD TRIMESTER PREGNANCY: Primary | ICD-10-CM

## 2021-12-27 PROCEDURE — 0502F SUBSEQUENT PRENATAL CARE: CPT | Performed by: OBSTETRICS & GYNECOLOGY

## 2021-12-27 NOTE — PROGRESS NOTES
Chief Complaint   Patient presents with   • Routine Prenatal Visit     Patient states shes doing well        HPI:   , 29w5d gestation reports doing well    ROS:  See Prenatal Episode/Flowsheet  /78   Wt 70.3 kg (155 lb)   LMP 2021   BMI 27.46 kg/m²      EXAM:  EXTREMITIES:  No swelling-See Prenatal Episode/Flowsheet    ABDOMEN:  FHTs/Movement noted-See Prenatal Episode/Flowsheet    URINE GLUCOSE/PROTEIN:  See Prenatal Episode/Flowsheet    PELVIC EXAM:  See Prenatal Episode/Flowsheet  CV:  Lungs:  GYN:    MDM:    Lab Results   Component Value Date    HGB 11.1 (L) 2021    RUBELLAABIGG 1.63 2021    HEPBSAG Negative 2021    ABO A 2021    RH Positive 2021    ABSCRN Negative 2021    [id number] Non Reactive 2021    HEPCVIRUSABY <0.1 2021    STREPGPB Positive (A) 10/25/2018    URINECX Final report 2021       U/S:    1. IUP 29w5d  2. Routine care   3. H/O GHTN with G1-- taking baby ASA, normal BP's  4. Growth U/S next time

## 2022-01-12 ENCOUNTER — ROUTINE PRENATAL (OUTPATIENT)
Dept: OBSTETRICS AND GYNECOLOGY | Facility: CLINIC | Age: 32
End: 2022-01-12

## 2022-01-12 VITALS — DIASTOLIC BLOOD PRESSURE: 62 MMHG | BODY MASS INDEX: 26.93 KG/M2 | WEIGHT: 152 LBS | SYSTOLIC BLOOD PRESSURE: 122 MMHG

## 2022-01-12 DIAGNOSIS — O09.93 ENCOUNTER FOR SUPERVISION OF HIGH RISK PREGNANCY IN THIRD TRIMESTER, ANTEPARTUM: Primary | ICD-10-CM

## 2022-01-12 DIAGNOSIS — O09.299 HX OF PREECLAMPSIA, PRIOR PREGNANCY, CURRENTLY PREGNANT: ICD-10-CM

## 2022-01-12 DIAGNOSIS — D50.9 IRON DEFICIENCY ANEMIA DURING PREGNANCY: ICD-10-CM

## 2022-01-12 DIAGNOSIS — O99.019 IRON DEFICIENCY ANEMIA DURING PREGNANCY: ICD-10-CM

## 2022-01-12 DIAGNOSIS — K21.9 GASTROESOPHAGEAL REFLUX DISEASE WITHOUT ESOPHAGITIS: ICD-10-CM

## 2022-01-12 DIAGNOSIS — Z36.89 ENCOUNTER FOR ULTRASOUND TO ASSESS FETAL GROWTH: ICD-10-CM

## 2022-01-12 PROCEDURE — 0502F SUBSEQUENT PRENATAL CARE: CPT | Performed by: OBSTETRICS & GYNECOLOGY

## 2022-01-12 RX ORDER — ONDANSETRON 8 MG/1
8 TABLET, ORALLY DISINTEGRATING ORAL EVERY 8 HOURS PRN
Qty: 30 TABLET | Refills: 0 | Status: SHIPPED | OUTPATIENT
Start: 2022-01-12 | End: 2022-04-18

## 2022-01-12 RX ORDER — FAMOTIDINE 20 MG/1
20 TABLET, FILM COATED ORAL 2 TIMES DAILY
Qty: 60 TABLET | Refills: 5 | Status: SHIPPED | OUTPATIENT
Start: 2022-01-12

## 2022-01-14 NOTE — PROGRESS NOTES
Chief Complaint  Routine Prenatal Visit (Growth scan today, no complaints. )    History of Present Illness:  Karmen is a  currently at 32w2d who presents today with complaints of no appetite.  Patient does have complaints of nausea as well.  Patient also has been having reflux.  She has not been on any medications.  Patient does report good fetal movement.  Patient denies any cramping or contractions.  Patient has been taking her prenatal vitamin.    Exam:  Vitals:  See prenatal flowsheet as noted and reviewed  General: Alert, cooperative, and does not appear in any distress  Abdomen:   See prenatal flowsheet as noted and reviewed    Uterus gravid, non-tender; no palpable masses    No guarding or rebound tenderness  Pelvic:  See prenatal flowsheet as noted and reviewed  Ext:  See prenatal flowsheet as noted and reviewed    Moves extremities well, no cyanosis and no redness  Urine:  See prenatal flowsheet as noted and reviewed    Data Review:  The following data was reviewed by: Velma Sosa MD on 2022:  Prenatal Labs:  Lab Results   Component Value Date    HGB 11.1 (L) 2021    RUBELLAABIGG 1.63 2021    HEPBSAG Negative 2021    ABO A 2021    RH Positive 2021    ABSCRN Negative 2021    [id number] Non Reactive 2021    HEPCVIRUSABY <0.1 2021    STREPGPB Positive (A) 10/25/2018    URINECX Final report 2021       No visits with results within 1 Month(s) from this visit.   Latest known visit with results is:   Routine Prenatal on 2021   Component Date Value   • WBC 2021 11.53*   • RBC 2021 3.54*   • Hemoglobin 2021 11.1*   • Hematocrit 2021 32.8*   • MCV 2021 92.7    • MCH 2021 31.4    • MCHC 2021 33.8    • RDW 2021 12.2*   • Platelets 2021 290    • Gestational Diabetes Scr* 2021 99    • Urine Culture 2021 Final report    • Result 1 2021 No growth    • Specific [place], UA  2021 1.012    • pH, UA 2021 6.5    • Color, UA 2021 Yellow    • Appearance, UA 2021 Clear    • Leukocytes, UA 2021 Trace*   • Protein 2021 Negative    • Glucose, UA 2021 Negative    • Ketones 2021 Negative    • Blood, UA 2021 Negative    • Bilirubin, UA 2021 Negative    • Urobilinogen, UA 2021 Comment    • Nitrite, UA 2021 Negative    • WBC, UA 2021 0-2    • RBC, UA 2021 0-2    • Epithelial Cells (non re* 2021 7-12*   • Cast Type 2021 Comment    • Bacteria, UA 2021 Comment      Imaging:  US Ob Follow Up Transabdominal Approach  Karmen Tracey  : 1990  MRN: [id number]  Date: 2022    Reason for exam/History:  Growth    Ultrasound images are reviewed.  There is noted to be a viable   intrauterine pregnancy. The pregnancy is measuring 32 weeks 1 days   gestation.  The estimated fetal weight is 1832 grams at the 32% for   growth.  The HC was at the 35.5% and the AC was at the 30.3%.  The   amniotic fluid index was 12.30 cms.  The fetal heart rate was normal.     The infant was in the vertex presentation.  The placental location was   noted to be anterior.      The exam limitations noted:  none    See ultrasound report for measurements and structures identified.    Velma Sosa MD, [facility]  OB GYN [place]    Medical Records:  None    Assessment and Plan:  Problem List Items Addressed This Visit     None      Visit Diagnoses     Encounter for supervision of high risk pregnancy in third trimester, antepartum    -  Primary  Topics discussed:     GERD management  kick counts and fetal movement  PIH precautions   labor signs and symptoms  Scan today as noted.  Patient has been informed regarding those findings.    Encounter for ultrasound to assess fetal growth        Relevant Orders    US Ob Follow Up Transabdominal Approach (Completed)    Hx of preeclampsia, prior pregnancy,  currently pregnant      Patient is to continue her baby aspirin.    Iron deficiency anemia during pregnancy      Patient is to continue her iron supplements.    Gastroesophageal reflux disease without esophagitis      Prescription is given for Pepcid.  Patient has also been instructed in small frequent meals.  Prescriptions also given for Zofran as noted.  Instructions and precautions are given.  Patient is to call if no improvement in her symptoms.  Patient is to follow-up as noted.    Relevant Medications    famotidine (PEPCID) 20 MG tablet    ondansetron ODT (ZOFRAN-ODT) 8 MG disintegrating tablet        Follow Up/Instructions:  Follow up as scheduled.  Patient was given instructions and counseling regarding her condition or for health maintenance advice. Please see specific information pulled into the AVS if appropriate.     Note: Speech recognition transcription software may have been used to dictate portions of this document.  An attempt at proofreading has been made though minor errors in transcription may still be present.    This note was electronically signed.  Velma Sosa M.D.

## 2022-01-21 ENCOUNTER — ROUTINE PRENATAL (OUTPATIENT)
Dept: OBSTETRICS AND GYNECOLOGY | Facility: CLINIC | Age: 32
End: 2022-01-21

## 2022-01-21 VITALS — SYSTOLIC BLOOD PRESSURE: 126 MMHG | DIASTOLIC BLOOD PRESSURE: 78 MMHG | WEIGHT: 156 LBS | BODY MASS INDEX: 27.63 KG/M2

## 2022-01-21 DIAGNOSIS — Z34.93 NORMAL PREGNANCY, THIRD TRIMESTER: Primary | ICD-10-CM

## 2022-01-21 PROCEDURE — 0502F SUBSEQUENT PRENATAL CARE: CPT | Performed by: NURSE PRACTITIONER

## 2022-01-21 NOTE — PROGRESS NOTES
[id number]  Chief Complaint   Patient presents with   • Routine Prenatal Visit     Patient states she doing well        HPI  Karmen is a  currently at 33w2d who today reports the following:     Doing well - no c/o   Good FM      EXAM  /78   Wt 70.8 kg (156 lb)   LMP 2021   BMI 27.63 kg/m²  -See Prenatal Assessment  General Appearance:  Pleasant  Lungs: Breathing unlabored  Abdomen:  See flow sheet for Fundal ht, FM, FHT's  LE: Neg edema  V/E: Not performed     Social History     Tobacco Use   • Smoking status: Never Smoker   • Smokeless tobacco: Never Used   Vaping Use   • Vaping Use: Never used   Substance Use Topics   • Alcohol use: No     Comment: Social alcohol use   • Drug use: No         Lab Results   Component Value Date    ABO A 2021    RH Positive 2021    ABSCRN Negative 2021       MDM  Impression: Supervision of normal pregnancy   Hx Preeclampsia    Tests done today: none   Topics discussed: continue to note good FM  Flu vaccination  T-dap   Nutrition reviewed   PIH precautions  encouraged questions - call prn   Written info optional/provided:  -T-DAP   Tests next visit: none

## 2022-01-31 ENCOUNTER — ROUTINE PRENATAL (OUTPATIENT)
Dept: OBSTETRICS AND GYNECOLOGY | Facility: CLINIC | Age: 32
End: 2022-01-31

## 2022-01-31 VITALS — SYSTOLIC BLOOD PRESSURE: 124 MMHG | DIASTOLIC BLOOD PRESSURE: 74 MMHG | WEIGHT: 158 LBS | BODY MASS INDEX: 27.99 KG/M2

## 2022-01-31 DIAGNOSIS — Z34.83 ENCOUNTER FOR SUPERVISION OF OTHER NORMAL PREGNANCY IN THIRD TRIMESTER: Primary | ICD-10-CM

## 2022-01-31 PROBLEM — B95.1 POSITIVE TESTING FOR GROUP B STREPTOCOCCUS: Status: RESOLVED | Noted: 2018-10-25 | Resolved: 2022-01-31

## 2022-01-31 PROCEDURE — 0502F SUBSEQUENT PRENATAL CARE: CPT | Performed by: MIDWIFE

## 2022-01-31 NOTE — PROGRESS NOTES
Chief Complaint   Patient presents with   • Routine Prenatal Visit     No Complaints/concerns, good fetal movement        HPI: Karmen is a  currently at 34w5d who today reports the following:  Baby is active. She denies any cramping or contractions.                EXAM:     Vitals:    22 1526   BP: 124/74      Abdomen:   See prenatal flowsheet as noted and reviewed, soft, nontender   Pelvic:  See prenatal flowsheet as noted and reviewed   Urine:  See prenatal flowsheet as noted and reviewed    Lab Results   Component Value Date    ABO A 2021    RH Positive 2021    ABSCRN Negative 2021       MDM:  Impression: Supervision of low risk pregnancy   Anemia in pregnancy   Tests done today: none   Topics discussed: kick counts and fetal movement   labor signs and symptoms  Reviewed OB labs   Tests next visit: GBS testing                RTO:                        2 weeks    This note was electronically signed.  Nettie Jewell, APRN  2022

## 2022-02-14 ENCOUNTER — ROUTINE PRENATAL (OUTPATIENT)
Dept: OBSTETRICS AND GYNECOLOGY | Facility: CLINIC | Age: 32
End: 2022-02-14

## 2022-02-14 ENCOUNTER — HOSPITAL ENCOUNTER (OUTPATIENT)
Facility: HOSPITAL | Age: 32
Discharge: HOME | End: 2022-02-14
Attending: OBSTETRICS & GYNECOLOGY | Admitting: OBSTETRICS & GYNECOLOGY

## 2022-02-14 VITALS — WEIGHT: 160 LBS | BODY MASS INDEX: 28.34 KG/M2 | DIASTOLIC BLOOD PRESSURE: 100 MMHG | SYSTOLIC BLOOD PRESSURE: 152 MMHG

## 2022-02-14 VITALS
TEMPERATURE: 98 F | RESPIRATION RATE: 18 BRPM | HEIGHT: 63 IN | BODY MASS INDEX: 28.07 KG/M2 | DIASTOLIC BLOOD PRESSURE: 74 MMHG | HEART RATE: 86 BPM | OXYGEN SATURATION: 100 % | SYSTOLIC BLOOD PRESSURE: 118 MMHG | WEIGHT: 158.4 LBS

## 2022-02-14 DIAGNOSIS — Z34.83 ENCOUNTER FOR SUPERVISION OF OTHER NORMAL PREGNANCY IN THIRD TRIMESTER: Primary | ICD-10-CM

## 2022-02-14 LAB
BILIRUB BLD-MCNC: NEGATIVE MG/DL
CLARITY, POC: CLEAR
COLOR UR: YELLOW
GLUCOSE UR STRIP-MCNC: NEGATIVE MG/DL
KETONES UR QL: NEGATIVE
LEUKOCYTE EST, POC: NEGATIVE
NITRITE UR-MCNC: NEGATIVE MG/ML
PH UR: 7.5 [PH] (ref 5–8)
PROT UR STRIP-MCNC: NEGATIVE MG/DL
RBC # UR STRIP: NEGATIVE /UL
SP GR UR: 1.01 (ref 1–1.03)
UROBILINOGEN UR QL: NORMAL

## 2022-02-14 PROCEDURE — 59025 FETAL NON-STRESS TEST: CPT

## 2022-02-14 PROCEDURE — 81002 URINALYSIS NONAUTO W/O SCOPE: CPT | Performed by: OBSTETRICS & GYNECOLOGY

## 2022-02-14 PROCEDURE — 0502F SUBSEQUENT PRENATAL CARE: CPT | Performed by: MIDWIFE

## 2022-02-14 NOTE — NURSING NOTE
Updated Dr. Albarran r/t patients BP, new orders to discharge home and return to the office Wednesday for BP check.

## 2022-02-14 NOTE — NON STRESS TEST
Triage Note - Nursing Documentation  Labor and Delivery Admission Log    Karmen Tracey  : 1990  MRN: [id number]  CSN: [id number]    Date in / Time in:  2022  Time in: 1632    Date out / Time out:  2022  Time out: 1800    Nurse: Trinidad Parra RN    Patient Info: She is a 31 y.o. year old  at 36w5d with an CLARE of 3/9/2022, by Last Menstrual Period who was seen on the [facility].    Chief Complaint:   Chief Complaint   Patient presents with   • Elevated Blood Pressure       Provider Instructions / Disposition: d/c home, return to office Wednesday for BP check    Patient Active Problem List   Diagnosis   • Acne   • Anxiety disorder   • Benign ovarian cyst   • Elevated cortisol level   • Elevated DHEA (HCC)   • Hypertension   • Palpitations   • Oral contraceptive use   • Personal history of leukemia   • Supervision of normal pregnancy   • Pregnancy       NST Documentation (Only applicable > 32 weeks): Interpretation A  Nonstress Test Interpretation A: Reactive (22 1810 : Trinidad Parra, RN)

## 2022-02-14 NOTE — PROGRESS NOTES
Chief Complaint   Patient presents with   • Routine Prenatal Visit     GBS done, No Complaints/concerns        HPI: Karmen is a  currently at 36w5d here for prenatal visit who reports the following:  Baby is active. She has occasional ctxs, cramping. She denies any blurred vision, dizziness, or headaches. She took her BP twice earlier today and it was 120-130s/60s.                EXAM:     Vitals:    22 1615   BP: 152/100      Abdomen:   See prenatal flowsheet as noted and reviewed, soft, nontender   Pelvic:  See prenatal flowsheet as noted and reviewed   Urine:  See prenatal flowsheet as noted and reviewed    Lab Results   Component Value Date    ABO A 2021    RH Positive 2021    ABSCRN Negative 2021       MDM:  Impression: Supervision of low risk pregnancy  elevated BP   Anemia in pregnancy   Tests done today: GBS testing   Topics discussed: kick counts and fetal movement  labor signs and symptoms  PIH precautions   To LH for NST, serial BPs   Tests next visit: none                RTO:                        1 weeks    This note was electronically signed.  Nettie Jewell, APRN  2022

## 2022-02-16 ENCOUNTER — ROUTINE PRENATAL (OUTPATIENT)
Dept: OBSTETRICS AND GYNECOLOGY | Facility: CLINIC | Age: 32
End: 2022-02-16

## 2022-02-16 VITALS — BODY MASS INDEX: 27.81 KG/M2 | WEIGHT: 157 LBS | SYSTOLIC BLOOD PRESSURE: 132 MMHG | DIASTOLIC BLOOD PRESSURE: 86 MMHG

## 2022-02-16 DIAGNOSIS — Z34.93 THIRD TRIMESTER PREGNANCY: Primary | ICD-10-CM

## 2022-02-16 PROCEDURE — 0502F SUBSEQUENT PRENATAL CARE: CPT | Performed by: OBSTETRICS & GYNECOLOGY

## 2022-02-16 NOTE — PROGRESS NOTES
Chief Complaint   Patient presents with   • Routine Prenatal Visit     Patient states shes doing well here for a bp check        HPI:   , 37w0d gestation reports doing well    ROS:  See Prenatal Episode/Flowsheet  /86   Wt 71.2 kg (157 lb)   LMP 2021   BMI 27.81 kg/m²      EXAM:  EXTREMITIES:  No swelling-See Prenatal Episode/Flowsheet    ABDOMEN:  FHTs/Movement noted-See Prenatal Episode/Flowsheet    URINE GLUCOSE/PROTEIN:  See Prenatal Episode/Flowsheet    PELVIC EXAM:  See Prenatal Episode/Flowsheet  CV:  Lungs:  GYN:    MDM:    Lab Results   Component Value Date    HGB 11.1 (L) 2021    RUBELLAABIGG 1.63 2021    HEPBSAG Negative 2021    ABO A 2021    RH Positive 2021    ABSCRN Negative 2021    VOS8PHH6 Non Reactive 2021    HEPCVIRUSABY <0.1 2021    STREPGPB Positive (A) 10/25/2018    URINECX Final report 2021       U/S:    1. IUP 37w0d  2. Routine care   3. BP stable- precautions given. dleivered last @ 38w4d for HTN  4. F/U Monday

## 2022-02-18 LAB
CLINDAMYCIN ISLT KB: NORMAL
GP B STREP DNA SPEC QL NAA+PROBE: POSITIVE
ORGANISM ID: NORMAL

## 2022-02-21 ENCOUNTER — HOSPITAL ENCOUNTER (OUTPATIENT)
Facility: HOSPITAL | Age: 32
Discharge: HOME | End: 2022-02-21
Attending: OBSTETRICS & GYNECOLOGY | Admitting: OBSTETRICS & GYNECOLOGY

## 2022-02-21 ENCOUNTER — ROUTINE PRENATAL (OUTPATIENT)
Dept: OBSTETRICS AND GYNECOLOGY | Facility: CLINIC | Age: 32
End: 2022-02-21

## 2022-02-21 VITALS — WEIGHT: 157 LBS | SYSTOLIC BLOOD PRESSURE: 140 MMHG | BODY MASS INDEX: 27.81 KG/M2 | DIASTOLIC BLOOD PRESSURE: 90 MMHG

## 2022-02-21 VITALS
TEMPERATURE: 98.2 F | OXYGEN SATURATION: 97 % | SYSTOLIC BLOOD PRESSURE: 114 MMHG | WEIGHT: 158.2 LBS | BODY MASS INDEX: 28.03 KG/M2 | RESPIRATION RATE: 18 BRPM | HEART RATE: 90 BPM | DIASTOLIC BLOOD PRESSURE: 77 MMHG | HEIGHT: 63 IN

## 2022-02-21 DIAGNOSIS — Z34.83 ENCOUNTER FOR SUPERVISION OF OTHER NORMAL PREGNANCY IN THIRD TRIMESTER: Primary | ICD-10-CM

## 2022-02-21 LAB
ALBUMIN SERPL-MCNC: 3.4 G/DL (ref 3.5–5.2)
ALBUMIN/GLOB SERPL: 1.4 G/DL
ALP SERPL-CCNC: 117 U/L (ref 39–117)
ALT SERPL W P-5'-P-CCNC: 15 U/L (ref 1–33)
ANION GAP SERPL CALCULATED.3IONS-SCNC: 9.6 MMOL/L (ref 5–15)
AST SERPL-CCNC: 20 U/L (ref 1–32)
BASOPHILS # BLD AUTO: 0.04 10*3/MM3 (ref 0–0.2)
BASOPHILS NFR BLD AUTO: 0.3 % (ref 0–1.5)
BILIRUB BLD-MCNC: NEGATIVE MG/DL
BILIRUB SERPL-MCNC: 0.2 MG/DL (ref 0–1.2)
BILIRUB UR QL STRIP: NEGATIVE
BUN SERPL-MCNC: 6 MG/DL (ref 6–20)
BUN/CREAT SERPL: 9.5 (ref 7–25)
CALCIUM SPEC-SCNC: 8.9 MG/DL (ref 8.6–10.5)
CHLORIDE SERPL-SCNC: 106 MMOL/L (ref 98–107)
CLARITY UR: CLEAR
CLARITY, POC: CLEAR
CO2 SERPL-SCNC: 21.4 MMOL/L (ref 22–29)
COLOR UR: YELLOW
COLOR UR: YELLOW
CREAT SERPL-MCNC: 0.63 MG/DL (ref 0.57–1)
CREAT UR-MCNC: 105.2 MG/DL
DEPRECATED RDW RBC AUTO: 47.1 FL (ref 37–54)
EOSINOPHIL # BLD AUTO: 0.07 10*3/MM3 (ref 0–0.4)
EOSINOPHIL NFR BLD AUTO: 0.6 % (ref 0.3–6.2)
ERYTHROCYTE [DISTWIDTH] IN BLOOD BY AUTOMATED COUNT: 13.7 % (ref 12.3–15.4)
FIBRINOGEN PPP-MCNC: 435 MG/DL (ref 209–518)
GFR SERPL CREATININE-BSD FRML MDRD: 111 ML/MIN/1.73
GLOBULIN UR ELPH-MCNC: 2.5 GM/DL
GLUCOSE SERPL-MCNC: 80 MG/DL (ref 65–99)
GLUCOSE UR STRIP-MCNC: NEGATIVE MG/DL
GLUCOSE UR STRIP-MCNC: NEGATIVE MG/DL
HCT VFR BLD AUTO: 33.1 % (ref 34–46.6)
HGB BLD-MCNC: 11.2 G/DL (ref 12–15.9)
HGB UR QL STRIP.AUTO: NEGATIVE
IMM GRANULOCYTES # BLD AUTO: 0.09 10*3/MM3 (ref 0–0.05)
IMM GRANULOCYTES NFR BLD AUTO: 0.8 % (ref 0–0.5)
KETONES UR QL STRIP: NEGATIVE
KETONES UR QL: NEGATIVE
LEUKOCYTE EST, POC: NEGATIVE
LEUKOCYTE ESTERASE UR QL STRIP.AUTO: NEGATIVE
LYMPHOCYTES # BLD AUTO: 1.41 10*3/MM3 (ref 0.7–3.1)
LYMPHOCYTES NFR BLD AUTO: 11.9 % (ref 19.6–45.3)
MCH RBC QN AUTO: 32.3 PG (ref 26.6–33)
MCHC RBC AUTO-ENTMCNC: 33.8 G/DL (ref 31.5–35.7)
MCV RBC AUTO: 95.4 FL (ref 79–97)
MONOCYTES # BLD AUTO: 0.63 10*3/MM3 (ref 0.1–0.9)
MONOCYTES NFR BLD AUTO: 5.3 % (ref 5–12)
NEUTROPHILS NFR BLD AUTO: 81.1 % (ref 42.7–76)
NEUTROPHILS NFR BLD AUTO: 9.65 10*3/MM3 (ref 1.7–7)
NITRITE UR QL STRIP: NEGATIVE
NITRITE UR-MCNC: NEGATIVE MG/ML
NRBC BLD AUTO-RTO: 0 /100 WBC (ref 0–0.2)
PH UR STRIP.AUTO: 7 [PH] (ref 5–8)
PH UR: 7 [PH] (ref 5–8)
PLATELET # BLD AUTO: 255 10*3/MM3 (ref 140–450)
PMV BLD AUTO: 9.3 FL (ref 6–12)
POTASSIUM SERPL-SCNC: 3.8 MMOL/L (ref 3.5–5.2)
PROT ?TM UR-MCNC: 11 MG/DL
PROT SERPL-MCNC: 5.9 G/DL (ref 6–8.5)
PROT UR QL STRIP: NEGATIVE
PROT UR STRIP-MCNC: NEGATIVE MG/DL
PROT/CREAT UR: 104.6 MG/G CREA (ref 0–200)
RBC # BLD AUTO: 3.47 10*6/MM3 (ref 3.77–5.28)
RBC # UR STRIP: NEGATIVE /UL
SODIUM SERPL-SCNC: 137 MMOL/L (ref 136–145)
SP GR UR STRIP: 1.01 (ref 1–1.03)
SP GR UR: 1.01 (ref 1–1.03)
UROBILINOGEN UR QL STRIP: NORMAL
UROBILINOGEN UR QL: NORMAL
WBC NRBC COR # BLD: 11.89 10*3/MM3 (ref 3.4–10.8)

## 2022-02-21 PROCEDURE — 80053 COMPREHEN METABOLIC PANEL: CPT | Performed by: OBSTETRICS & GYNECOLOGY

## 2022-02-21 PROCEDURE — 82570 ASSAY OF URINE CREATININE: CPT | Performed by: OBSTETRICS & GYNECOLOGY

## 2022-02-21 PROCEDURE — 0502F SUBSEQUENT PRENATAL CARE: CPT | Performed by: MIDWIFE

## 2022-02-21 PROCEDURE — 81002 URINALYSIS NONAUTO W/O SCOPE: CPT | Performed by: OBSTETRICS & GYNECOLOGY

## 2022-02-21 PROCEDURE — 84156 ASSAY OF PROTEIN URINE: CPT | Performed by: OBSTETRICS & GYNECOLOGY

## 2022-02-21 PROCEDURE — 85384 FIBRINOGEN ACTIVITY: CPT | Performed by: OBSTETRICS & GYNECOLOGY

## 2022-02-21 PROCEDURE — G0463 HOSPITAL OUTPT CLINIC VISIT: HCPCS

## 2022-02-21 PROCEDURE — 85025 COMPLETE CBC W/AUTO DIFF WBC: CPT | Performed by: OBSTETRICS & GYNECOLOGY

## 2022-02-21 PROCEDURE — 59025 FETAL NON-STRESS TEST: CPT

## 2022-02-21 PROCEDURE — 81003 URINALYSIS AUTO W/O SCOPE: CPT | Performed by: OBSTETRICS & GYNECOLOGY

## 2022-02-21 PROCEDURE — 36415 COLL VENOUS BLD VENIPUNCTURE: CPT | Performed by: OBSTETRICS & GYNECOLOGY

## 2022-02-21 PROCEDURE — 59025 FETAL NON-STRESS TEST: CPT | Performed by: OBSTETRICS & GYNECOLOGY

## 2022-02-21 NOTE — NON STRESS TEST
Triage Note - Nursing Documentation  Labor and Delivery Admission Log    Karmen Tracey  : 1990  MRN: [id number]  CSN: [id number]    Date in / Time in:  2022  Time in: 1500    Date out / Time out:  2022  Time out: 1740    Nurse: Trinidad Parra RN    Patient Info: She is a 31 y.o. year old  at 37w5d with an CLARE of 3/9/2022, by Last Menstrual Period who was seen on the [facility].    Chief Complaint:   Chief Complaint   Patient presents with   • Non-stress Test     elevated bp       Provider Instructions / Disposition: discharge home, instructed on s/s of preeclampsia, f/u with OB office Wednesday or Thursday.     Patient Active Problem List   Diagnosis   • Acne   • Anxiety disorder   • Benign ovarian cyst   • Elevated cortisol level   • Elevated DHEA (HCC)   • Hypertension   • Palpitations   • Oral contraceptive use   • Personal history of leukemia   • Supervision of normal pregnancy   • Pregnancy       NST Documentation (Only applicable > 32 weeks): Interpretation A  Nonstress Test Interpretation A: Reactive (22 1736 : Trinidad Parra, RN)

## 2022-02-21 NOTE — PROGRESS NOTES
Chief Complaint   Patient presents with   • Routine Prenatal Visit     c/o of nausea possible acid reflux        HPI: Karmen is a  currently at 37w5d here for prenatal visit who reports the following:  Baby is active.  She denies any headaches, blurred vision, or dizziness.  She states she has not felt good today and feels a little nauseated.  Her BP at home today was 118/79 and 110/75.                EXAM:     Vitals:    22 1329   BP: 140/90      Abdomen:   See prenatal flowsheet as noted and reviewed, soft, nontender   Pelvic:  See prenatal flowsheet as noted and reviewed, FT/50%/-2   Urine:  See prenatal flowsheet as noted and reviewed    Lab Results   Component Value Date    ABO A 2021    RH Positive 2021    ABSCRN Negative 2021       MDM:  Impression: Supervision of low risk pregnancy  Gestational hypertension   Anemia in pregnancy  GBS +   Tests done today: none   Topics discussed: kick counts and fetal movement  labor signs and symptoms  PIH precautions  To  for serial BPs and NST   Tests next visit: none                RTO:                        1 weeks    This note was electronically signed.  Nettie Jewell, APRN  2022

## 2022-02-23 NOTE — NON STRESS TEST
Non Stress Test    [facility]    Patient: Karmen Tracey  : 1990  MRN: [id number]  CSN: [id number]    Gestational Age: 37w5d    Indication for NST Gestational htn       Time On 15:04   Time Off 17:36       Interpretation    Baseline 's beats per minute   Category 1   Decelerations Absent       Additional Comments See nursing notes       Recommendations for f/u See nursing notes       This note has been electronically signed.    Velma Sosa M.D.

## 2022-02-28 ENCOUNTER — ROUTINE PRENATAL (OUTPATIENT)
Dept: OBSTETRICS AND GYNECOLOGY | Facility: CLINIC | Age: 32
End: 2022-02-28

## 2022-02-28 ENCOUNTER — PREP FOR SURGERY (OUTPATIENT)
Dept: OTHER | Facility: HOSPITAL | Age: 32
End: 2022-02-28

## 2022-02-28 VITALS — DIASTOLIC BLOOD PRESSURE: 86 MMHG | SYSTOLIC BLOOD PRESSURE: 156 MMHG | WEIGHT: 158 LBS | BODY MASS INDEX: 28 KG/M2

## 2022-02-28 DIAGNOSIS — Z34.90 ENCOUNTER FOR INDUCTION OF LABOR: Primary | ICD-10-CM

## 2022-02-28 DIAGNOSIS — O13.3 GESTATIONAL HYPERTENSION, THIRD TRIMESTER: Primary | ICD-10-CM

## 2022-02-28 PROCEDURE — 0502F SUBSEQUENT PRENATAL CARE: CPT | Performed by: MIDWIFE

## 2022-02-28 RX ORDER — MORPHINE SULFATE 4 MG/ML
4 INJECTION, SOLUTION INTRAMUSCULAR; INTRAVENOUS ONCE AS NEEDED
Status: CANCELLED | OUTPATIENT
Start: 2022-02-28

## 2022-02-28 RX ORDER — ONDANSETRON 4 MG/1
4 TABLET, FILM COATED ORAL ONCE AS NEEDED
Status: CANCELLED | OUTPATIENT
Start: 2022-02-28

## 2022-02-28 RX ORDER — MORPHINE SULFATE 4 MG/ML
4 INJECTION, SOLUTION INTRAMUSCULAR; INTRAVENOUS EVERY 4 HOURS PRN
Status: CANCELLED | OUTPATIENT
Start: 2022-02-28 | End: 2022-03-10

## 2022-02-28 RX ORDER — ONDANSETRON 2 MG/ML
4 INJECTION INTRAMUSCULAR; INTRAVENOUS ONCE AS NEEDED
Status: CANCELLED | OUTPATIENT
Start: 2022-02-28

## 2022-02-28 RX ORDER — SODIUM CHLORIDE, SODIUM LACTATE, POTASSIUM CHLORIDE, CALCIUM CHLORIDE 600; 310; 30; 20 MG/100ML; MG/100ML; MG/100ML; MG/100ML
125 INJECTION, SOLUTION INTRAVENOUS CONTINUOUS
Status: CANCELLED | OUTPATIENT
Start: 2022-02-28

## 2022-02-28 RX ORDER — PROMETHAZINE HYDROCHLORIDE 12.5 MG/1
12.5 TABLET ORAL EVERY 6 HOURS PRN
Status: CANCELLED | OUTPATIENT
Start: 2022-02-28

## 2022-02-28 RX ORDER — ACETAMINOPHEN 325 MG/1
650 TABLET ORAL ONCE AS NEEDED
Status: CANCELLED | OUTPATIENT
Start: 2022-02-28

## 2022-02-28 RX ORDER — LIDOCAINE HYDROCHLORIDE 10 MG/ML
5 INJECTION, SOLUTION EPIDURAL; INFILTRATION; INTRACAUDAL; PERINEURAL AS NEEDED
Status: CANCELLED | OUTPATIENT
Start: 2022-02-28

## 2022-02-28 RX ORDER — MORPHINE SULFATE 2 MG/ML
2 INJECTION, SOLUTION INTRAMUSCULAR; INTRAVENOUS ONCE AS NEEDED
Status: CANCELLED | OUTPATIENT
Start: 2022-02-28

## 2022-02-28 RX ORDER — MISOPROSTOL 200 UG/1
800 TABLET ORAL ONCE AS NEEDED
Status: CANCELLED | OUTPATIENT
Start: 2022-02-28 | End: 2022-03-01

## 2022-02-28 RX ORDER — MAGNESIUM CARB/ALUMINUM HYDROX 105-160MG
30 TABLET,CHEWABLE ORAL ONCE
Status: CANCELLED | OUTPATIENT
Start: 2022-02-28 | End: 2022-02-28

## 2022-02-28 RX ORDER — PROMETHAZINE HYDROCHLORIDE 12.5 MG/1
12.5 SUPPOSITORY RECTAL EVERY 6 HOURS PRN
Status: CANCELLED | OUTPATIENT
Start: 2022-02-28

## 2022-02-28 RX ORDER — SODIUM CHLORIDE 0.9 % (FLUSH) 0.9 %
1-10 SYRINGE (ML) INJECTION AS NEEDED
Status: CANCELLED | OUTPATIENT
Start: 2022-02-28

## 2022-02-28 RX ORDER — OXYTOCIN/0.9 % SODIUM CHLORIDE 30/500 ML
1-20 PLASTIC BAG, INJECTION (ML) INTRAVENOUS
Status: CANCELLED | OUTPATIENT
Start: 2022-02-28

## 2022-02-28 RX ORDER — SODIUM CHLORIDE 0.9 % (FLUSH) 0.9 %
10 SYRINGE (ML) INJECTION EVERY 12 HOURS SCHEDULED
Status: CANCELLED | OUTPATIENT
Start: 2022-02-28

## 2022-02-28 RX ORDER — CARBOPROST TROMETHAMINE 250 UG/ML
250 INJECTION, SOLUTION INTRAMUSCULAR ONCE AS NEEDED
Status: CANCELLED | OUTPATIENT
Start: 2022-02-28 | End: 2022-03-01

## 2022-02-28 RX ORDER — OXYTOCIN/0.9 % SODIUM CHLORIDE 30/500 ML
650 PLASTIC BAG, INJECTION (ML) INTRAVENOUS ONCE
Status: CANCELLED | OUTPATIENT
Start: 2022-02-28 | End: 2022-02-28

## 2022-02-28 RX ORDER — METHYLERGONOVINE MALEATE 0.2 MG/ML
200 INJECTION INTRAVENOUS ONCE AS NEEDED
Status: CANCELLED | OUTPATIENT
Start: 2022-02-28 | End: 2022-03-01

## 2022-02-28 RX ORDER — MORPHINE SULFATE 1 MG/ML
6 INJECTION, SOLUTION EPIDURAL; INTRATHECAL; INTRAVENOUS EVERY 4 HOURS PRN
Status: CANCELLED | OUTPATIENT
Start: 2022-02-28 | End: 2022-03-10

## 2022-02-28 RX ORDER — OXYTOCIN/0.9 % SODIUM CHLORIDE 30/500 ML
85 PLASTIC BAG, INJECTION (ML) INTRAVENOUS ONCE
Status: CANCELLED | OUTPATIENT
Start: 2022-02-28 | End: 2022-02-28

## 2022-02-28 NOTE — PROGRESS NOTES
Chief Complaint   Patient presents with   • Routine Prenatal Visit     no complaints/concerns        HPI: Karmen is a  currently at 38w5d here for prenatal visit who reports the following:  Baby is active. She denies any cramping or contractions. She has checked her BP @ home and they have been normal range. She denies any headache, blurred vision, or dizziness.                EXAM:     Vitals:    22 1539   BP: 156/86      Abdomen:   See prenatal flowsheet as noted and reviewed, soft, nontender   Pelvic:  See prenatal flowsheet as noted and reviewed 1/50/-3 posterior   Urine:  See prenatal flowsheet as noted and reviewed    Lab Results   Component Value Date    ABO A 2021    RH Positive 2021    ABSCRN Negative 2021       MDM:  Impression: Chronic HTN in pregnancy  Anemia in pregnancy   Tests done today: none   Topics discussed: Consulted with Dr Vleez: induction of labor, LDP 2/3 @ 8pm  kick counts and fetal movement  labor signs and symptoms  PIH precautions   Tests next visit: none                RTO:                      LDP 3/2 @ 8pm    This note was electronically signed.  Nettie Jewell, APRN  2022

## 2022-03-01 ENCOUNTER — TELEPHONE (OUTPATIENT)
Dept: OBSTETRICS AND GYNECOLOGY | Facility: CLINIC | Age: 32
End: 2022-03-01

## 2022-03-01 NOTE — TELEPHONE ENCOUNTER
I have reviewed her labs. Everything looks good for delivery. Platelets are good. Yes, iron is a little low but not concerning.

## 2022-03-01 NOTE — TELEPHONE ENCOUNTER
----- Message from Karmen Tracey sent at 3/1/2022  7:39 AM EST -----  Regarding: Test Results from last Monday  I was just wondering about the test results I had from last Monday. They discharged me from [facility] saying they were good, so I never looked at them on the portal until recently. I noticed everything is either high or low.     My bloodwork is usually not like that, so is this just a pregnancy thing? It was even better with my son than it is now, being pregnant with my daughter. I go in for induction Wed night, so I'm getting all the typical anxieties. I just wanted to make sure everything looked good so far, and what you all thought of the labs?     My platelets and hemoglobin were what was really a concern to me. My platelets are usually in the 3s, but I've noticed they've been trending down a lot this whole pregnancy. I've been taking my iron supplements everyday as well.    Just trying to ease some of my anxieties before delivering.     Thank you!

## 2022-03-02 ENCOUNTER — HOSPITAL ENCOUNTER (INPATIENT)
Dept: LABOR AND DELIVERY | Facility: HOSPITAL | Age: 32
Discharge: HOME | End: 2022-03-02

## 2022-03-02 ENCOUNTER — HOSPITAL ENCOUNTER (INPATIENT)
Facility: HOSPITAL | Age: 32
LOS: 2 days | Discharge: HOME | End: 2022-03-04
Attending: OBSTETRICS & GYNECOLOGY | Admitting: MIDWIFE

## 2022-03-02 DIAGNOSIS — Z34.90 ENCOUNTER FOR INDUCTION OF LABOR: ICD-10-CM

## 2022-03-02 LAB
ABO GROUP BLD: NORMAL
BASOPHILS # BLD AUTO: 0.05 10*3/MM3 (ref 0–0.2)
BASOPHILS NFR BLD AUTO: 0.4 % (ref 0–1.5)
BLD GP AB SCN SERPL QL: NEGATIVE
DEPRECATED RDW RBC AUTO: 46.1 FL (ref 37–54)
EOSINOPHIL # BLD AUTO: 0.09 10*3/MM3 (ref 0–0.4)
EOSINOPHIL NFR BLD AUTO: 0.7 % (ref 0.3–6.2)
ERYTHROCYTE [DISTWIDTH] IN BLOOD BY AUTOMATED COUNT: 13.5 % (ref 12.3–15.4)
HCT VFR BLD AUTO: 36.3 % (ref 34–46.6)
HGB BLD-MCNC: 12.4 G/DL (ref 12–15.9)
IMM GRANULOCYTES # BLD AUTO: 0.11 10*3/MM3 (ref 0–0.05)
IMM GRANULOCYTES NFR BLD AUTO: 0.9 % (ref 0–0.5)
LYMPHOCYTES # BLD AUTO: 2.09 10*3/MM3 (ref 0.7–3.1)
LYMPHOCYTES NFR BLD AUTO: 17.1 % (ref 19.6–45.3)
MCH RBC QN AUTO: 32.2 PG (ref 26.6–33)
MCHC RBC AUTO-ENTMCNC: 34.2 G/DL (ref 31.5–35.7)
MCV RBC AUTO: 94.3 FL (ref 79–97)
MONOCYTES # BLD AUTO: 0.93 10*3/MM3 (ref 0.1–0.9)
MONOCYTES NFR BLD AUTO: 7.6 % (ref 5–12)
NEUTROPHILS NFR BLD AUTO: 73.3 % (ref 42.7–76)
NEUTROPHILS NFR BLD AUTO: 8.97 10*3/MM3 (ref 1.7–7)
NRBC BLD AUTO-RTO: 0 /100 WBC (ref 0–0.2)
PLATELET # BLD AUTO: 279 10*3/MM3 (ref 140–450)
PMV BLD AUTO: 9.6 FL (ref 6–12)
RBC # BLD AUTO: 3.85 10*6/MM3 (ref 3.77–5.28)
RH BLD: POSITIVE
SARS-COV-2 RNA PNL SPEC NAA+PROBE: NOT DETECTED
T&S EXPIRATION DATE: NORMAL
WBC NRBC COR # BLD: 12.24 10*3/MM3 (ref 3.4–10.8)

## 2022-03-02 PROCEDURE — 3E033VJ INTRODUCTION OF OTHER HORMONE INTO PERIPHERAL VEIN, PERCUTANEOUS APPROACH: ICD-10-PCS | Performed by: MIDWIFE

## 2022-03-02 PROCEDURE — 85025 COMPLETE CBC W/AUTO DIFF WBC: CPT | Performed by: MIDWIFE

## 2022-03-02 PROCEDURE — 86850 RBC ANTIBODY SCREEN: CPT | Performed by: MIDWIFE

## 2022-03-02 PROCEDURE — 87635 SARS-COV-2 COVID-19 AMP PRB: CPT | Performed by: MIDWIFE

## 2022-03-02 PROCEDURE — 59025 FETAL NON-STRESS TEST: CPT

## 2022-03-02 PROCEDURE — 25010000002 VANCOMYCIN 1 G RECONSTITUTED SOLUTION: Performed by: MIDWIFE

## 2022-03-02 PROCEDURE — 86901 BLOOD TYPING SEROLOGIC RH(D): CPT | Performed by: MIDWIFE

## 2022-03-02 PROCEDURE — 86900 BLOOD TYPING SEROLOGIC ABO: CPT | Performed by: MIDWIFE

## 2022-03-02 RX ORDER — PROMETHAZINE HYDROCHLORIDE 12.5 MG/1
12.5 TABLET ORAL EVERY 6 HOURS PRN
Status: DISCONTINUED | OUTPATIENT
Start: 2022-03-02 | End: 2022-03-03

## 2022-03-02 RX ORDER — MORPHINE SULFATE 4 MG/ML
4 INJECTION, SOLUTION INTRAMUSCULAR; INTRAVENOUS EVERY 4 HOURS PRN
Status: DISCONTINUED | OUTPATIENT
Start: 2022-03-02 | End: 2022-03-03

## 2022-03-02 RX ORDER — SODIUM CHLORIDE, SODIUM LACTATE, POTASSIUM CHLORIDE, CALCIUM CHLORIDE 600; 310; 30; 20 MG/100ML; MG/100ML; MG/100ML; MG/100ML
125 INJECTION, SOLUTION INTRAVENOUS CONTINUOUS
Status: DISCONTINUED | OUTPATIENT
Start: 2022-03-02 | End: 2022-03-03

## 2022-03-02 RX ORDER — MORPHINE SULFATE 2 MG/ML
6 INJECTION, SOLUTION INTRAMUSCULAR; INTRAVENOUS EVERY 4 HOURS PRN
Status: DISCONTINUED | OUTPATIENT
Start: 2022-03-02 | End: 2022-03-03

## 2022-03-02 RX ORDER — PROMETHAZINE HYDROCHLORIDE 12.5 MG/1
12.5 SUPPOSITORY RECTAL EVERY 6 HOURS PRN
Status: DISCONTINUED | OUTPATIENT
Start: 2022-03-02 | End: 2022-03-03

## 2022-03-02 RX ORDER — OXYTOCIN/0.9 % SODIUM CHLORIDE 30/500 ML
1-20 PLASTIC BAG, INJECTION (ML) INTRAVENOUS
Status: DISCONTINUED | OUTPATIENT
Start: 2022-03-02 | End: 2022-03-03

## 2022-03-02 RX ORDER — SODIUM CHLORIDE 0.9 % (FLUSH) 0.9 %
10 SYRINGE (ML) INJECTION EVERY 12 HOURS SCHEDULED
Status: DISCONTINUED | OUTPATIENT
Start: 2022-03-02 | End: 2022-03-03

## 2022-03-02 RX ORDER — MAGNESIUM CARB/ALUMINUM HYDROX 105-160MG
30 TABLET,CHEWABLE ORAL ONCE
Status: COMPLETED | OUTPATIENT
Start: 2022-03-02 | End: 2022-03-03

## 2022-03-02 RX ORDER — LIDOCAINE HYDROCHLORIDE 10 MG/ML
5 INJECTION, SOLUTION EPIDURAL; INFILTRATION; INTRACAUDAL; PERINEURAL AS NEEDED
Status: DISCONTINUED | OUTPATIENT
Start: 2022-03-02 | End: 2022-03-03

## 2022-03-02 RX ORDER — SODIUM CHLORIDE 0.9 % (FLUSH) 0.9 %
1-10 SYRINGE (ML) INJECTION AS NEEDED
Status: DISCONTINUED | OUTPATIENT
Start: 2022-03-02 | End: 2022-03-03

## 2022-03-02 RX ADMIN — SODIUM CHLORIDE, POTASSIUM CHLORIDE, SODIUM LACTATE AND CALCIUM CHLORIDE 125 ML/HR: 600; 310; 30; 20 INJECTION, SOLUTION INTRAVENOUS at 20:45

## 2022-03-02 RX ADMIN — Medication 1 MILLI-UNITS/MIN: at 22:04

## 2022-03-02 RX ADMIN — SODIUM CHLORIDE 1 G: 900 INJECTION, SOLUTION INTRAVENOUS at 21:25

## 2022-03-03 ENCOUNTER — ANESTHESIA EVENT (OUTPATIENT)
Dept: LABOR AND DELIVERY | Facility: HOSPITAL | Age: 32
End: 2022-03-03

## 2022-03-03 ENCOUNTER — ANESTHESIA (OUTPATIENT)
Dept: LABOR AND DELIVERY | Facility: HOSPITAL | Age: 32
End: 2022-03-03

## 2022-03-03 PROCEDURE — 59410 OBSTETRICAL CARE: CPT | Performed by: MIDWIFE

## 2022-03-03 PROCEDURE — 0KQM0ZZ REPAIR PERINEUM MUSCLE, OPEN APPROACH: ICD-10-PCS | Performed by: MIDWIFE

## 2022-03-03 PROCEDURE — 25010000002 FENTANYL CITRATE (PF) 100 MCG/2ML SOLUTION: Performed by: NURSE ANESTHETIST, CERTIFIED REGISTERED

## 2022-03-03 PROCEDURE — 25010000002 MORPHINE PER 10 MG: Performed by: MIDWIFE

## 2022-03-03 RX ORDER — MORPHINE SULFATE 4 MG/ML
4 INJECTION, SOLUTION INTRAMUSCULAR; INTRAVENOUS ONCE AS NEEDED
Status: DISCONTINUED | OUTPATIENT
Start: 2022-03-03 | End: 2022-03-04 | Stop reason: HOSPADM

## 2022-03-03 RX ORDER — DOCUSATE SODIUM 100 MG/1
100 CAPSULE, LIQUID FILLED ORAL 2 TIMES DAILY
Status: DISCONTINUED | OUTPATIENT
Start: 2022-03-03 | End: 2022-03-04 | Stop reason: HOSPADM

## 2022-03-03 RX ORDER — HYDROCODONE BITARTRATE AND ACETAMINOPHEN 5; 325 MG/1; MG/1
1 TABLET ORAL EVERY 4 HOURS PRN
Status: DISCONTINUED | OUTPATIENT
Start: 2022-03-03 | End: 2022-03-04 | Stop reason: HOSPADM

## 2022-03-03 RX ORDER — METHYLERGONOVINE MALEATE 0.2 MG/ML
200 INJECTION INTRAVENOUS ONCE AS NEEDED
Status: DISCONTINUED | OUTPATIENT
Start: 2022-03-03 | End: 2022-03-04 | Stop reason: HOSPADM

## 2022-03-03 RX ORDER — BISACODYL 10 MG
10 SUPPOSITORY, RECTAL RECTAL DAILY PRN
Status: DISCONTINUED | OUTPATIENT
Start: 2022-03-04 | End: 2022-03-04 | Stop reason: HOSPADM

## 2022-03-03 RX ORDER — PROMETHAZINE HYDROCHLORIDE 12.5 MG/1
12.5 SUPPOSITORY RECTAL EVERY 6 HOURS PRN
Status: DISCONTINUED | OUTPATIENT
Start: 2022-03-03 | End: 2022-03-04 | Stop reason: HOSPADM

## 2022-03-03 RX ORDER — ONDANSETRON 2 MG/ML
4 INJECTION INTRAMUSCULAR; INTRAVENOUS ONCE AS NEEDED
Status: DISCONTINUED | OUTPATIENT
Start: 2022-03-03 | End: 2022-03-04 | Stop reason: HOSPADM

## 2022-03-03 RX ORDER — SODIUM CHLORIDE 0.9 % (FLUSH) 0.9 %
1-10 SYRINGE (ML) INJECTION AS NEEDED
Status: DISCONTINUED | OUTPATIENT
Start: 2022-03-03 | End: 2022-03-04 | Stop reason: HOSPADM

## 2022-03-03 RX ORDER — PRENATAL VIT/IRON FUM/FOLIC AC 27MG-0.8MG
1 TABLET ORAL DAILY
Status: DISCONTINUED | OUTPATIENT
Start: 2022-03-03 | End: 2022-03-04 | Stop reason: HOSPADM

## 2022-03-03 RX ORDER — ACETAMINOPHEN 325 MG/1
650 TABLET ORAL ONCE AS NEEDED
Status: DISCONTINUED | OUTPATIENT
Start: 2022-03-03 | End: 2022-03-04 | Stop reason: HOSPADM

## 2022-03-03 RX ORDER — HYDROCORTISONE 25 MG/G
1 CREAM TOPICAL AS NEEDED
Status: DISCONTINUED | OUTPATIENT
Start: 2022-03-03 | End: 2022-03-04 | Stop reason: HOSPADM

## 2022-03-03 RX ORDER — MISOPROSTOL 200 UG/1
800 TABLET ORAL ONCE AS NEEDED
Status: DISCONTINUED | OUTPATIENT
Start: 2022-03-03 | End: 2022-03-04 | Stop reason: HOSPADM

## 2022-03-03 RX ORDER — HYDROCODONE BITARTRATE AND ACETAMINOPHEN 7.5; 325 MG/1; MG/1
1 TABLET ORAL EVERY 4 HOURS PRN
Status: DISCONTINUED | OUTPATIENT
Start: 2022-03-03 | End: 2022-03-04 | Stop reason: HOSPADM

## 2022-03-03 RX ORDER — IBUPROFEN 600 MG/1
600 TABLET ORAL EVERY 6 HOURS PRN
Status: DISCONTINUED | OUTPATIENT
Start: 2022-03-03 | End: 2022-03-04 | Stop reason: HOSPADM

## 2022-03-03 RX ORDER — FENTANYL CITRATE 50 UG/ML
INJECTION, SOLUTION INTRAMUSCULAR; INTRAVENOUS AS NEEDED
Status: DISCONTINUED | OUTPATIENT
Start: 2022-03-03 | End: 2022-03-03 | Stop reason: SURG

## 2022-03-03 RX ORDER — ONDANSETRON 2 MG/ML
4 INJECTION INTRAMUSCULAR; INTRAVENOUS EVERY 6 HOURS PRN
Status: DISCONTINUED | OUTPATIENT
Start: 2022-03-03 | End: 2022-03-04 | Stop reason: HOSPADM

## 2022-03-03 RX ORDER — EPHEDRINE SULFATE 5 MG/ML
5 INJECTION INTRAVENOUS
Status: DISCONTINUED | OUTPATIENT
Start: 2022-03-03 | End: 2022-03-03

## 2022-03-03 RX ORDER — FENTANYL CITRATE 50 UG/ML
INJECTION, SOLUTION INTRAMUSCULAR; INTRAVENOUS
Status: COMPLETED
Start: 2022-03-03 | End: 2022-03-03

## 2022-03-03 RX ORDER — OXYTOCIN/0.9 % SODIUM CHLORIDE 30/500 ML
650 PLASTIC BAG, INJECTION (ML) INTRAVENOUS ONCE
Status: DISCONTINUED | OUTPATIENT
Start: 2022-03-03 | End: 2022-03-04 | Stop reason: HOSPADM

## 2022-03-03 RX ORDER — PROMETHAZINE HYDROCHLORIDE 25 MG/1
25 TABLET ORAL EVERY 6 HOURS PRN
Status: DISCONTINUED | OUTPATIENT
Start: 2022-03-03 | End: 2022-03-04 | Stop reason: HOSPADM

## 2022-03-03 RX ORDER — OXYTOCIN/0.9 % SODIUM CHLORIDE 30/500 ML
85 PLASTIC BAG, INJECTION (ML) INTRAVENOUS ONCE
Status: DISCONTINUED | OUTPATIENT
Start: 2022-03-03 | End: 2022-03-04 | Stop reason: HOSPADM

## 2022-03-03 RX ORDER — CARBOPROST TROMETHAMINE 250 UG/ML
250 INJECTION, SOLUTION INTRAMUSCULAR ONCE AS NEEDED
Status: DISCONTINUED | OUTPATIENT
Start: 2022-03-03 | End: 2022-03-04 | Stop reason: HOSPADM

## 2022-03-03 RX ORDER — ONDANSETRON 4 MG/1
4 TABLET, FILM COATED ORAL ONCE AS NEEDED
Status: DISCONTINUED | OUTPATIENT
Start: 2022-03-03 | End: 2022-03-04 | Stop reason: HOSPADM

## 2022-03-03 RX ORDER — MORPHINE SULFATE 2 MG/ML
2 INJECTION, SOLUTION INTRAMUSCULAR; INTRAVENOUS ONCE AS NEEDED
Status: DISCONTINUED | OUTPATIENT
Start: 2022-03-03 | End: 2022-03-04 | Stop reason: HOSPADM

## 2022-03-03 RX ORDER — ONDANSETRON 4 MG/1
4 TABLET, FILM COATED ORAL EVERY 8 HOURS PRN
Status: DISCONTINUED | OUTPATIENT
Start: 2022-03-03 | End: 2022-03-04 | Stop reason: HOSPADM

## 2022-03-03 RX ADMIN — SODIUM CHLORIDE, POTASSIUM CHLORIDE, SODIUM LACTATE AND CALCIUM CHLORIDE 1000 ML: 600; 310; 30; 20 INJECTION, SOLUTION INTRAVENOUS at 08:10

## 2022-03-03 RX ADMIN — SODIUM CHLORIDE, POTASSIUM CHLORIDE, SODIUM LACTATE AND CALCIUM CHLORIDE 1000 ML: 600; 310; 30; 20 INJECTION, SOLUTION INTRAVENOUS at 05:23

## 2022-03-03 RX ADMIN — FENTANYL CITRATE 25 MCG: 50 INJECTION, SOLUTION INTRAMUSCULAR; INTRAVENOUS at 08:19

## 2022-03-03 RX ADMIN — Medication 85 MILLI-UNITS/MIN: at 09:31

## 2022-03-03 RX ADMIN — MINERAL OIL 30 ML: 1000 SOLUTION ORAL at 08:48

## 2022-03-03 RX ADMIN — IBUPROFEN 600 MG: 600 TABLET ORAL at 17:25

## 2022-03-03 RX ADMIN — DOCUSATE SODIUM 100 MG: 100 CAPSULE, LIQUID FILLED ORAL at 20:46

## 2022-03-03 RX ADMIN — MORPHINE SULFATE 4 MG: 4 INJECTION, SOLUTION INTRAMUSCULAR; INTRAVENOUS at 06:44

## 2022-03-03 NOTE — L&D DELIVERY NOTE
[facility]  Vaginal Delivery Note   Review the Delivery Report for details.       Delivery     Delivery: Vaginal, Spontaneous     YOB: 2021    Time of Birth:  Gestational Age 8:52 AM   39w1d     Anesthesia: Spinal     Delivering clinician: Nettie Jewell    Forceps?   No   Vacuum? No    Shoulder dystocia present: No        Delivery narrative:  Karmen progressed to C+P and pushed effectively. Mouth and nose bulb suctioned on perineum. Anterior shoulder was delivered easily with gentle traction and maternal effort followed by posterior shoulder.  viable female. Infant stimulated, dried, and placed on mom's abdomen. Infant with lusty cry and spontaneous respirations. Delayed cord double clamped; cut by aunt. Cord blood obtained. Placenta delivered Angela intact with 3V cord. Pitocin 20 units IV given. Second degree perineal laceration repaired. FF @ U, light rubra lochia.     Infant    Findings: female  infant     Infant observations: Weight: 3204 g (7 lb 1 oz)   Length: 20.5  in  Observations/Comments:        Apgars: 8  @ 1 minute /    9  @ 5 minutes   Infant Name: Toñitosjoe     Placenta, Cord, and Fluid    Placenta delivered  Spontaneous  at   3/3/2022  8:56 AM     Cord: 3 vessels  present.   Nuchal Cord?  no   Cord blood obtained: Yes    Cord gases obtained:  No    Cord gas results: Venous:  No results found for: PHCVEN    Arterial:  No results found for: PHCART     Repair    Episiotomy: None     No    Lacerations: Yes  Laceration Information  Laceration Repaired?   Perineal: 2nd  Yes    Periurethral:       Labial:       Sulcus:       Vaginal:       Cervical:         Suture used for repair: 3-0 chromic gut   Estimated Blood Loss: Est. Blood Loss (mL): 250 mL (Filed from Delivery Summary) (22 5968)             Quantitative Blood Loss:                  Complications  none    Disposition  Mother to Mother Baby/Postpartum  in stable condition currently.  Baby remains with mom  in stable  condition currently.      Nettie Jewell CNM  03/03/22  10:19 EST

## 2022-03-03 NOTE — ANESTHESIA PROCEDURE NOTES
Intrathecal Block      Patient reassessed immediately prior to procedure    Patient location during procedure: OB  Indication:at surgeon's request and procedure for pain  Performed By  CRNA: Arnulfo Donnelly CRNA  Preanesthetic Checklist  Completed: patient identified, site marked, surgical consent, pre-op evaluation, timeout performed, IV checked, risks and benefits discussed and monitors and equipment checked  Additional Notes  Marcaine 0.75% in dex 0.5ml with fentanyl 25mcg injected easily and slowly after positive aspiration of clear csf. Needle removed site swabbed with sterile alcohol swab assisted to supine position. Tolerated intrathecal without adverse sequelae, adequate analgesia noted, vss  Intrathecal Block Prep:  Pt Position:sitting  Sterile Tech:sterile barrier, gloves and cap  Prep:chloraprep and air dry 3 min per clock  Monitoring:blood pressure monitoring, continuous pulse oximetry and EKG  Intrathecal Block Procedure:  Approach:midline  Guidance:landmark technique and palpation technique  Location:L3-L4  Needle Type:Cristina  Needle Gauge:25 G  Placement of Needle Event: cerebrospinal fluid  Fluid Appearance:clear  Post Assessment:  Patient Tolerance:patient tolerated the procedure well with no apparent complications  Complications:no

## 2022-03-03 NOTE — NON STRESS TEST
Karmen Tracye, a  at 39w0d with an CLARE of 3/9/2022, by Last Menstrual Period, was seen at [facility] for a nonstress test.    Chief Complaint   Patient presents with   • Scheduled Induction       Patient Active Problem List   Diagnosis   • Acne   • Anxiety disorder   • Benign ovarian cyst   • Elevated cortisol level   • Elevated DHEA (HCC)   • Hypertension   • Palpitations   • Oral contraceptive use   • Personal history of leukemia   • Supervision of normal pregnancy   • Pregnancy       Start Time:   Stop Time:     Interpretation A  Nonstress Test Interpretation A: Reactive (22 2100 : Svetlana Barcenas, RN)

## 2022-03-03 NOTE — H&P
[facility]  Karmen Tracey  : 1990  MRN: [id number]  CSN: [id number]    History and Physical    Chief Complaint   Patient presents with   • Scheduled Induction      Subjective   Karmen Tracey is a 31 y.o. year old  with an Estimated Date of Delivery: 3/9/22 currently 39w1d presenting for induction of labor for gestational hypertension. She received Morphine x 1 and has progressed quickly this morning.     Risks of labor induction including prolongation of labor, increased risks for both  section and operative vaginal birth have been discussed at length.     Prenatal care has been with [facility].  It has been complicated by gestational hypertension and anemia. First PNV on 8/3/21 @ 8 weeks with 15 visits. Weight gain = 19 lbs.     OB History    Para Term  AB Living   2 1 1 0 0 1   SAB IAB Ectopic Molar Multiple Live Births   0 0 0 0 0 1      # Outcome Date GA Lbr Harmeet/2nd Weight Sex Delivery Anes PTL Lv   2 Current            1 Term 18 38w4d / 00:22 2863 g (6 lb 5 oz) M Vag-Spont EPI N AUTUMN      Name: DELISA TRACEY      Apgar1: 8  Apgar5: 9     Past Medical History:   Diagnosis Date   • Anxiety    • Gestational hypertension     current   • History of Papanicolaou smear of cervix 2016    normal   • Migraine    • Oral contraceptive use    • Personal history of leukemia    • Positive testing for group B Streptococcus 10/25/2018     Past Surgical History:   Procedure Laterality Date   • ADENOIDECTOMY     • BREAST BIOPSY     • BREAST LUMPECTOMY Left 2018    benign    • TONSILLECTOMY     • WISDOM TOOTH EXTRACTION         Current Facility-Administered Medications:   •  ePHEDrine Sulfate 5 MG/ML injection 5 mg, 5 mg, Intravenous, Q10 Min PRN, Nettie Jewell CNM  •  fentaNYL citrate (PF) (SUBLIMAZE) 50 mcg/mL injection  - ADS Override Pull, , , ,   •  lactated ringers bolus 1,000 mL, 1,000 mL, Intravenous, Once, Nettie Jewell CNM  •   "lactated ringers infusion, 125 mL/hr, Intravenous, Continuous, Nettie Jewell A, CNM, Last Rate: 999 mL/hr at 03/03/22 0754, 999 mL/hr at 03/03/22 0754  •  lidocaine PF 1% (XYLOCAINE) injection 5 mL, 5 mL, Intradermal, PRN, Nettie Jewell A, CNM  •  mineral oil liquid 30 mL, 30 mL, Topical, Once, Nettie Jewell CNM  •  Morphine sulfate (PF) injection 4 mg, 4 mg, Intravenous, Q4H PRN, Nettie Jewell, CNM, 4 mg at 03/03/22 0644  •  Morphine sulfate (PF) injection 6 mg, 6 mg, Intravenous, Q4H PRN, Nettie Jewlel CNM  •  oxytocin (PITOCIN) 30 units in 0.9% sodium chloride 500 mL (premix), 1-20 brennen-units/min, Intravenous, Titrated, Nettie Jewell, ELÍASM, Last Rate: 2 mL/hr at 03/03/22 0630, 2 brennen-units/min at 03/03/22 0630  •  promethazine (PHENERGAN) suppository 12.5 mg, 12.5 mg, Rectal, Q6H PRN **OR** promethazine (PHENERGAN) tablet 12.5 mg, 12.5 mg, Oral, Q6H PRN, Nettie Jewell, CNM  •  ropivacaine 0.2% + fentaNYL 2mcg/mL NS epidural, 12 mL/hr, Epidural, Continuous, Nettie Jewell A, CNM  •  sodium chloride 0.9 % flush 1-10 mL, 1-10 mL, Intravenous, PRN, Nettie Jewell A, CNM  •  sodium chloride 0.9 % flush 10 mL, 10 mL, Intravenous, Q12H, Nettie Jewell A, CNM  •  vancomycin 1 g/250 mL 0.9% NS (vial-mate), 1 g, Intravenous, Q12H, Nettie Jewell A, CNM, 1 g at 03/02/22 2125    Allergies   Allergen Reactions   • Amoxicillin-Pot Clavulanate Rash   • Cefaclor Rash     Social History    Tobacco Use      Smoking status: Never Smoker      Smokeless tobacco: Never Used    Review of Systems   Constitutional: Negative.    Respiratory: Negative.    Cardiovascular: Negative.    Genitourinary: Negative.    Musculoskeletal: Negative.    Psychiatric/Behavioral: Negative.    All other systems reviewed and are negative.        Objective   /81   Pulse 82   Temp 98.5 °F (36.9 °C) (Oral)   Resp 18   Ht 160 cm (63\")   Wt 72.7 kg (160 lb 3.2 oz)   LMP 06/02/2021   SpO2 98%   Breastfeeding Yes   BMI " 28.38 kg/m²                                           General:  well developed; well nourished  hurting, moaning   Neck:    Heart:   supple and no masses  normal apical impulse  regular rate and rhythm   Lungs: breathing is unlabored   Abdomen: soft, non-tender; no masses  gravid  EFW: 6.5#, growth scan 32%, anterior placenta   FHT's: reassuring, reactive and category 1   Cervix: was checked (by me): 9 cm / 100 % / 0   Presentation: cephalic   Contractions: regular - external monitors used   Extremities:   normal appearance with no cyanosis or edema and no calf tenderness   Skin:  Skin color, texture, turgor normal. No rashes or lesions or Skin warm and dry   Psych:  Normal. and Alert and oriented, appropriate affect.       Prenatal Labs  Lab Results   Component Value Date    HGB 12.4 03/02/2022    HEPBSAG Negative 08/03/2021    ABSCRN Negative 03/02/2022    ILP9ATN7 Non Reactive 08/03/2021    HEPCVIRUSABY <0.1 08/03/2021    STREPGPB Positive (A) 02/15/2022    URINECX Final report 11/29/2021       Current Labs Reviewed   Lab Results (last 24 hours)     Procedure Component Value Units Date/Time    COVID PRE-OP / PRE-PROCEDURE SCREENING ORDER (NO ISOLATION) - Swab, Nasal Cavity [[id number]]  (Normal) Collected: 03/02/22 2101    Specimen: Swab from Nasal Cavity Updated: 03/02/22 2145    Narrative:      The following orders were created for panel order COVID PRE-OP / PRE-PROCEDURE SCREENING ORDER (NO ISOLATION) - Swab, Nasal Cavity.  Procedure                               Abnormality         Status                     ---------                               -----------         ------                     COVID-19,[facility]...[[id number]]  Normal              Final result                 Please view results for these tests on the individual orders.    COVID-19,[facility] IN-HOUSE,Nasal Swab No Transport Media 3-4 HR TAT - Swab, Nasal Cavity [[id number]]  (Normal) Collected: 03/02/22 2101    Specimen: Swab from Nasal  Cavity Updated: 03/02/22 2145     COVID19 Not Detected    Narrative:      Fact sheet for providers: https://www.fda.gov/media/532458/download     Fact sheet for patients: https://www.fda.gov/media/419322/download    Test performed by PCR.    Consider negative results in combination with clinical observations, patient history, and epidemiological information.    CBC & Differential [[id number]]  (Abnormal) Collected: 03/02/22 2039    Specimen: Blood Updated: 03/02/22 2049    Narrative:      The following orders were created for panel order CBC & Differential.  Procedure                               Abnormality         Status                     ---------                               -----------         ------                     CBC Auto Differential[[id number]]        Abnormal            Final result                 Please view results for these tests on the individual orders.    CBC Auto Differential [[id number]]  (Abnormal) Collected: 03/02/22 2039    Specimen: Blood Updated: 03/02/22 2049     WBC 12.24 10*3/mm3      RBC 3.85 10*6/mm3      Hemoglobin 12.4 g/dL      Hematocrit 36.3 %      MCV 94.3 fL      MCH 32.2 pg      MCHC 34.2 g/dL      RDW 13.5 %      RDW-SD 46.1 fl      MPV 9.6 fL      Platelets 279 10*3/mm3      Neutrophil % 73.3 %      Lymphocyte % 17.1 %      Monocyte % 7.6 %      Eosinophil % 0.7 %      Basophil % 0.4 %      Immature Grans % 0.9 %      Neutrophils, Absolute 8.97 10*3/mm3      Lymphocytes, Absolute 2.09 10*3/mm3      Monocytes, Absolute 0.93 10*3/mm3      Eosinophils, Absolute 0.09 10*3/mm3      Basophils, Absolute 0.05 10*3/mm3      Immature Grans, Absolute 0.11 10*3/mm3      nRBC 0.0 /100 WBC              ASSESSMENT  1. IUP at 39w1d  2. Induction of labor because of gestational hypertension  3. Group B strep status: positive  4. Reactive NST    PLAN  1. Admit to   2. Low dose Pitocin induction and increase per protocol @ 5am  3. All procedures explained to patient and spouse. Questions  answered and verbalized understanding.  4. Epidural/Intrathecal  5. Anticipate     Nettie Jewell, APRN  3/3/2022  08:20 EST

## 2022-03-03 NOTE — ANESTHESIA PREPROCEDURE EVALUATION
Anesthesia Evaluation     Patient summary reviewed and Nursing notes reviewed   no history of anesthetic complications:  NPO Solid Status: > 8 hours  NPO Liquid Status: > 8 hours           Airway   Mallampati: II  TM distance: >3 FB  Neck ROM: full  No difficulty expected  Dental - normal exam     Pulmonary - normal exam   (-) not a smoker  Cardiovascular - normal exam    PT is on anticoagulation therapy    (+) hypertension,       Neuro/Psych  (+) headaches, psychiatric history Anxiety and Depression,    GI/Hepatic/Renal/Endo - negative ROS     Musculoskeletal (-) negative ROS    Abdominal  - normal exam    Bowel sounds: normal.   Substance History - negative use     OB/GYN    (+) Pregnant, pregnancy induced hypertension        Other        ROS/Med Hx Other: plt 279                    Anesthesia Plan    ASA 2 - emergent     epidural   (Risks discussed , including but not limited to:  Headache, itching, n&v, infection, failure, decreased blood pressure, permanent chronic/back pain, nerve damage, paralysis, etc. All questions answered and informed consent obtained. Ski)    Anesthetic plan, all risks, benefits, and alternatives have been provided, discussed and informed consent has been obtained with: patient and spouse/significant other.

## 2022-03-04 VITALS
OXYGEN SATURATION: 97 % | HEIGHT: 63 IN | HEART RATE: 75 BPM | SYSTOLIC BLOOD PRESSURE: 126 MMHG | WEIGHT: 160.2 LBS | DIASTOLIC BLOOD PRESSURE: 85 MMHG | RESPIRATION RATE: 18 BRPM | BODY MASS INDEX: 28.39 KG/M2 | TEMPERATURE: 97.6 F

## 2022-03-04 LAB
BASOPHILS # BLD AUTO: 0.04 10*3/MM3 (ref 0–0.2)
BASOPHILS NFR BLD AUTO: 0.3 % (ref 0–1.5)
DEPRECATED RDW RBC AUTO: 47.4 FL (ref 37–54)
EOSINOPHIL # BLD AUTO: 0.23 10*3/MM3 (ref 0–0.4)
EOSINOPHIL NFR BLD AUTO: 1.8 % (ref 0.3–6.2)
ERYTHROCYTE [DISTWIDTH] IN BLOOD BY AUTOMATED COUNT: 13.6 % (ref 12.3–15.4)
HCT VFR BLD AUTO: 32.2 % (ref 34–46.6)
HGB BLD-MCNC: 10.6 G/DL (ref 12–15.9)
IMM GRANULOCYTES # BLD AUTO: 0.08 10*3/MM3 (ref 0–0.05)
IMM GRANULOCYTES NFR BLD AUTO: 0.6 % (ref 0–0.5)
LYMPHOCYTES # BLD AUTO: 2.32 10*3/MM3 (ref 0.7–3.1)
LYMPHOCYTES NFR BLD AUTO: 18.6 % (ref 19.6–45.3)
MCH RBC QN AUTO: 31.5 PG (ref 26.6–33)
MCHC RBC AUTO-ENTMCNC: 32.9 G/DL (ref 31.5–35.7)
MCV RBC AUTO: 95.5 FL (ref 79–97)
MONOCYTES # BLD AUTO: 0.84 10*3/MM3 (ref 0.1–0.9)
MONOCYTES NFR BLD AUTO: 6.7 % (ref 5–12)
NEUTROPHILS NFR BLD AUTO: 72 % (ref 42.7–76)
NEUTROPHILS NFR BLD AUTO: 8.97 10*3/MM3 (ref 1.7–7)
NRBC BLD AUTO-RTO: 0 /100 WBC (ref 0–0.2)
PLATELET # BLD AUTO: 239 10*3/MM3 (ref 140–450)
PMV BLD AUTO: 9.3 FL (ref 6–12)
RBC # BLD AUTO: 3.37 10*6/MM3 (ref 3.77–5.28)
WBC NRBC COR # BLD: 12.48 10*3/MM3 (ref 3.4–10.8)

## 2022-03-04 PROCEDURE — 85025 COMPLETE CBC W/AUTO DIFF WBC: CPT | Performed by: MIDWIFE

## 2022-03-04 RX ORDER — IBUPROFEN 600 MG/1
600 TABLET ORAL EVERY 6 HOURS PRN
Qty: 60 TABLET | Refills: 0 | Status: SHIPPED | OUTPATIENT
Start: 2022-03-04

## 2022-03-04 RX ADMIN — IBUPROFEN 600 MG: 600 TABLET ORAL at 01:40

## 2022-03-04 RX ADMIN — PRENATAL VITAMINS-IRON FUMARATE 27 MG IRON-FOLIC ACID 0.8 MG TABLET 1 TABLET: at 08:26

## 2022-03-04 RX ADMIN — DOCUSATE SODIUM 100 MG: 100 CAPSULE, LIQUID FILLED ORAL at 08:26

## 2022-03-04 NOTE — PROGRESS NOTES
"Patient: Karmen Tracey  * No surgery found *  Anesthesia type: Anesthesia type cannot be found on the log.    Patient location: Labor and Delivery  Last vitals: /85 (BP Location: Right arm, Patient Position: Lying)   Pulse 75   Temp 97.6 °F (36.4 °C) (Oral)   Resp 18   Ht 160 cm (63\")   Wt 72.7 kg (160 lb 3.2 oz)   LMP 06/02/2021   SpO2 97%   Breastfeeding Yes   BMI 28.38 kg/m²   Level of consciousness: awake, alert and oriented    Post-anesthesia pain: adequate analgesia  Airway patency: patent  Respiratory: unassisted  Cardiovascular: stable and blood pressure at baseline  Hydration: euvolemic    Anesthetic complications: no  "

## 2022-03-04 NOTE — PLAN OF CARE
Goal Outcome Evaluation:              Outcome Summary: VSS,adequate pain control, breast and bottlefeeding,+ bonding, continue with routine care,hoping for D/C

## 2022-03-04 NOTE — DISCHARGE SUMMARY
Discharge Summary     Ayo Peraza MAY Tracey  : 1990  MRN: [id number]  CSN: [id number]    Date of Admission: 3/2/2022   Date of Discharge:  3/4/2022   Delivering Physician: Nettie BURGER Foster        Admission Diagnosis: 1. Pregnancy [Z34.90]   Discharge Diagnosis: 1. Same as above plus  2. Pregnancy at 39w1d - delivered       Procedures: 3/3/2022  - Vaginal, Spontaneous       Hospital Course  Patient is a 31 y.o.  who at 39w1d had a vaginal birth.  Her postpartum course was without complications.  On PPD #2 she was ready for discharge.  She had normal lochia and pain well controlled with oral medications. She is both breast and bottle feeding.    Infant  female  fetus weighing 3204 g (7 lb 1 oz)   Apgars -  8 @ 1 minute /  9 @ 5 minutes.    Discharge labs  Lab Results   Component Value Date    WBC 12.48 (H) 2022    HGB 10.6 (L) 2022    HCT 32.2 (L) 2022     2022       Discharge Medications     Discharge Medications      New Medications      Instructions Start Date   ibuprofen 600 MG tablet  Commonly known as: ADVIL,MOTRIN   600 mg, Oral, Every 6 Hours PRN         Continue These Medications      Instructions Start Date   famotidine 20 MG tablet  Commonly known as: PEPCID   20 mg, Oral, 2 Times Daily      ferrous sulfate 325 (65 FE) MG tablet   325 mg, Oral, Daily With Breakfast      mupirocin 2 % ointment  Commonly known as: BACTROBAN   APPLY A SMALL AMOUNT TO THE AFFECTED AREA BY TOPICAL ROUTE 3 TIMES PER DAY      ondansetron ODT 8 MG disintegrating tablet  Commonly known as: ZOFRAN-ODT   8 mg, Translingual, Every 8 Hours PRN      prenatal vitamin 27-0.8 27-0.8 MG tablet tablet   Oral, Daily      ZyrTEC Allergy 10 MG tablet  Generic drug: cetirizine   Oral, Daily         Stop These Medications    ASPIRIN 81 PO            Discharge Disposition Home or Self Care   Condition on Discharge: good   Follow-up: 6 weeks with ANGEL Rollins     Time spent on discharge:  30 minutes or less  Nettie Jewell, APRN  3/4/2022

## 2022-04-18 ENCOUNTER — POSTPARTUM VISIT (OUTPATIENT)
Dept: OBSTETRICS AND GYNECOLOGY | Facility: CLINIC | Age: 32
End: 2022-04-18

## 2022-04-18 VITALS
BODY MASS INDEX: 25.52 KG/M2 | WEIGHT: 144 LBS | HEIGHT: 63 IN | SYSTOLIC BLOOD PRESSURE: 134 MMHG | DIASTOLIC BLOOD PRESSURE: 90 MMHG

## 2022-04-18 PROBLEM — Z34.90 PREGNANCY: Status: RESOLVED | Noted: 2018-11-16 | Resolved: 2022-04-18

## 2022-04-18 PROBLEM — Z34.90 SUPERVISION OF NORMAL PREGNANCY: Status: RESOLVED | Noted: 2018-05-18 | Resolved: 2022-04-18

## 2022-04-18 PROCEDURE — 0503F POSTPARTUM CARE VISIT: CPT | Performed by: MIDWIFE

## 2022-04-18 NOTE — PROGRESS NOTES
"History  Chief Complaint   Patient presents with   • Postpartum Care     Postpartum 3/3/22 vaginal , Kathy, last pap 2020,patient is breast/formula right now, patient does not want birthcontrol        Karmen Tracey is a 31 y.o. year old  presenting to be seen for her postpartum visit.  She had a vaginal delivery.    Since delivery she has been sexually active. She has used condoms.  She does not have concerns about post-partum blues/depression.   She is both breast and bottle feeding. Milk supply has diminished significantly.  For ongoing contraception, her plans are condoms and vasectomy.  She has not had a menstrual cycle.         Physical  /90   Ht 160 cm (63\")   Wt 65.3 kg (144 lb)   LMP 2021   BMI 25.51 kg/m²     General:  well developed; well nourished  no acute distress   Abdomen: soft, non-tender; no masses   Pelvis: External genitalia:  normal appearance of the external genitalia including Bartholin's and Annona's glands.  Uterus:  normal size, shape and consistency.  Adnexa:  normal bimanual exam of the adnexa.        Assessment   1. Normal 6 week postpartum exam  2. Contraceptive management     Plan   1. BC options reviewed and compared today: condoms and vasectomy  2. Pap smear not due  3. Follow up January for annual exam    No orders of the defined types were placed in this encounter.         This note was electronically signed.  OLVIN Van  2022  "

## 2023-12-04 NOTE — TELEPHONE ENCOUNTER
----- Message from Stacy Carey sent at 8/29/2017  2:00 PM EDT -----  Contact: PT  THIS IS PAT'S PT.  SHE IS SCHEDULED FOR ANNUAL IN October.  PLEASE SEND IN REFILLS OF MICROGESTIN 1/20 TO [facility] IN [place].  THANKS   Home

## 2024-02-01 ENCOUNTER — OFFICE VISIT (OUTPATIENT)
Dept: OBSTETRICS AND GYNECOLOGY | Facility: CLINIC | Age: 34
End: 2024-02-01
Payer: MEDICAID

## 2024-02-01 VITALS
SYSTOLIC BLOOD PRESSURE: 130 MMHG | DIASTOLIC BLOOD PRESSURE: 80 MMHG | HEIGHT: 63 IN | WEIGHT: 131.2 LBS | BODY MASS INDEX: 23.25 KG/M2

## 2024-02-01 DIAGNOSIS — Z12.4 SCREENING FOR CERVICAL CANCER: ICD-10-CM

## 2024-02-01 DIAGNOSIS — Z01.419 ROUTINE GYNECOLOGICAL EXAMINATION: Primary | ICD-10-CM

## 2024-02-01 NOTE — PROGRESS NOTES
Subjective   Chief Complaint   Patient presents with    Gynecologic Exam     Last pap done 20-WNL, No complaints       Karmen Tracey is a 33 y.o. year old  presenting to be seen for her annual gynecological exam.   No complaints or concerns  Cycles regular q month and ..Patient's last menstrual period was 2024 (exact date).   Not using contraception-does not want contraception    Past Medical History:   Diagnosis Date    Anxiety     Gestational hypertension     current    History of Papanicolaou smear of cervix 2016    normal    Migraine     Oral contraceptive use     Personal history of leukemia 1993    Positive testing for group B Streptococcus 10/25/2018        Current Outpatient Medications:     cetirizine (ZYRTEC ALLERGY) 10 MG tablet, Take  by mouth daily., Disp: , Rfl:    Allergies   Allergen Reactions    Amoxicillin-Pot Clavulanate Rash    Cefaclor Rash      Past Surgical History:   Procedure Laterality Date    ADENOIDECTOMY      BREAST BIOPSY      BREAST LUMPECTOMY Left 2018    benign     TONSILLECTOMY  1996    WISDOM TOOTH EXTRACTION  2010      Social History     Socioeconomic History    Marital status:    Tobacco Use    Smoking status: Never    Smokeless tobacco: Never   Vaping Use    Vaping Use: Never used   Substance and Sexual Activity    Alcohol use: No     Comment: Social alcohol use    Drug use: No    Sexual activity: Yes     Partners: Male     Birth control/protection: None      Family History   Problem Relation Age of Onset    Breast cancer Maternal Grandmother     Stroke Maternal Grandmother     Hypertension Maternal Grandmother     Hypertension Father     Hypertension Mother     Diabetes Sister     Stroke Paternal Grandfather     Hypertension Paternal Grandfather     Hypertension Paternal Grandmother     Heart attack Paternal Grandmother     Hypertension Maternal Grandfather        Review of Systems   Constitutional:  Negative for chills, diaphoresis and fever.  "  Gastrointestinal: Negative.    Genitourinary:  Negative for difficulty urinating, dysuria, menstrual problem and pelvic pain.           Objective   /80   Ht 160 cm (63\")   Wt 59.5 kg (131 lb 3.2 oz)   LMP 01/23/2024 (Exact Date)   Breastfeeding No   BMI 23.24 kg/m²     Physical Exam  Exam conducted with a chaperone present.   Constitutional:       Appearance: Normal appearance. She is well-developed and well-groomed.   Eyes:      General: Lids are normal.      Extraocular Movements: Extraocular movements intact.      Conjunctiva/sclera: Conjunctivae normal.   Neck:      Thyroid: No thyroid mass.   Chest:   Breasts:     Breasts are symmetrical.      Right: No inverted nipple, mass, nipple discharge, skin change or tenderness.      Left: No inverted nipple, mass, nipple discharge, skin change or tenderness.   Abdominal:      General: There is no distension.      Palpations: Abdomen is soft.      Tenderness: There is no abdominal tenderness.   Genitourinary:     Exam position: Lithotomy position.      Labia:         Right: No rash, tenderness or lesion.         Left: No rash, tenderness or lesion.       Urethra: No prolapse, urethral pain, urethral swelling or urethral lesion.      Vagina: No vaginal discharge, tenderness or lesions.      Cervix: No cervical motion tenderness, discharge, friability or lesion.      Uterus: Not enlarged and not tender.       Adnexa:         Right: No mass or tenderness.          Left: No mass or tenderness.     Musculoskeletal:      Cervical back: Neck supple.   Lymphadenopathy:      Upper Body:      Right upper body: No axillary adenopathy.      Left upper body: No axillary adenopathy.   Skin:     General: Skin is warm and dry.      Findings: No lesion.   Neurological:      General: No focal deficit present.      Mental Status: She is alert and oriented to person, place, and time.   Psychiatric:         Attention and Perception: Attention normal.         Mood and Affect: " Mood normal.         Speech: Speech normal.         Behavior: Behavior normal.         Thought Content: Thought content normal.            Result Review :                   Assessment and Plan  Diagnoses and all orders for this visit:    1. Routine gynecological examination (Primary)    2. Screening for cervical cancer      Patient Instructions   Self breast exam monthly  Regular exercise             This note was electronically signed.    Sushma Prakash PA-C   February 1, 2024

## 2024-02-08 LAB — REF LAB TEST METHOD: NORMAL

## 2024-02-12 ENCOUNTER — TELEPHONE (OUTPATIENT)
Dept: OBSTETRICS AND GYNECOLOGY | Facility: CLINIC | Age: 34
End: 2024-02-12
Payer: COMMERCIAL

## 2024-02-12 NOTE — TELEPHONE ENCOUNTER
"----- Message from Karmen Tracey sent at 2/12/2024 12:19 PM EST -----  Regarding: Pap  Contact: [phone]  Hi Virgen,     I called on Thursday regarding my latest pap and the nurse said it was nothing to worry about. I've also read your comment too. However, I'm a huge worrier and I can't help but to worry that this pap has came back abnormal. Nothing in my life has changed besides I have 1 more kid since my last testing. I'm just wondering...what can be causing these cellular changes? Is this typical sometimes to have a random \"bad\" pap? Should I have another before the year is up? I'm always worried about cancer since I had childhood cancer. I guess I'm just asking...should I be worried about this or is this something you see pretty commonly? I'm just trying to ease my anxieties. Thank you so much.    Karmen  "

## 2024-02-12 NOTE — TELEPHONE ENCOUNTER
"----- Message from Karmen Tracey sent at 2/12/2024 12:19 PM EST -----  Regarding: Pap  Contact: [phone]  Hi Virgen,     I called on Thursday regarding my latest pap and the nurse said it was nothing to worry about. I've also read your comment too. However, I'm a huge worrier and I can't help but to worry that this pap has came back abnormal. Nothing in my life has changed besides I have 1 more kid since my last testing. I'm just wondering...what can be causing these cellular changes? Is this typical sometimes to have a random \"bad\" pap? Should I have another before the year is up? I'm always worried about cancer since I had childhood cancer. I guess I'm just asking...should I be worried about this or is this something you see pretty commonly? I'm just trying to ease my anxieties. Thank you so much.    Karmen  "

## 2025-04-01 ENCOUNTER — INITIAL PRENATAL (OUTPATIENT)
Dept: OBSTETRICS AND GYNECOLOGY | Facility: CLINIC | Age: 35
End: 2025-04-01
Payer: COMMERCIAL

## 2025-04-01 VITALS — WEIGHT: 149.8 LBS | BODY MASS INDEX: 26.54 KG/M2 | DIASTOLIC BLOOD PRESSURE: 88 MMHG | SYSTOLIC BLOOD PRESSURE: 128 MMHG

## 2025-04-01 DIAGNOSIS — O36.80X0 ENCOUNTER TO DETERMINE FETAL VIABILITY OF PREGNANCY, SINGLE OR UNSPECIFIED FETUS: ICD-10-CM

## 2025-04-01 DIAGNOSIS — O10.919 CHRONIC HYPERTENSION AFFECTING PREGNANCY: ICD-10-CM

## 2025-04-01 DIAGNOSIS — O26.899 PREGNANCY RELATED NAUSEA, ANTEPARTUM: ICD-10-CM

## 2025-04-01 DIAGNOSIS — Z34.91 PRENATAL CARE, FIRST TRIMESTER: Primary | ICD-10-CM

## 2025-04-01 DIAGNOSIS — R11.0 PREGNANCY RELATED NAUSEA, ANTEPARTUM: ICD-10-CM

## 2025-04-01 RX ORDER — PHENOL 1.4 %
AEROSOL, SPRAY (ML) MUCOUS MEMBRANE
COMMUNITY
Start: 2024-12-05 | End: 2025-04-01

## 2025-04-01 RX ORDER — ASPIRIN 81 MG/1
81 TABLET ORAL DAILY
Qty: 30 TABLET | Refills: 8 | Status: CANCELLED | OUTPATIENT
Start: 2025-04-01

## 2025-04-01 RX ORDER — FLUTICASONE PROPIONATE 50 MCG
SPRAY, SUSPENSION (ML) NASAL
COMMUNITY
Start: 2024-12-05

## 2025-04-01 RX ORDER — DIPHENOXYLATE HYDROCHLORIDE AND ATROPINE SULFATE 2.5; .025 MG/1; MG/1
1 TABLET ORAL DAILY
COMMUNITY
End: 2025-04-01

## 2025-04-01 RX ORDER — PNV NO.95/FERROUS FUM/FOLIC AC 28MG-0.8MG
1 TABLET ORAL DAILY
COMMUNITY

## 2025-04-01 RX ORDER — PYRIDOXINE HCL (VITAMIN B6) 25 MG
25 TABLET ORAL NIGHTLY
Qty: 30 TABLET | Refills: 5 | Status: SHIPPED | OUTPATIENT
Start: 2025-04-01

## 2025-04-01 RX ORDER — LABETALOL 100 MG/1
100 TABLET, FILM COATED ORAL
COMMUNITY
Start: 2024-10-11 | End: 2025-04-01

## 2025-04-01 RX ORDER — ASPIRIN 81 MG/1
81 TABLET, CHEWABLE ORAL DAILY
COMMUNITY

## 2025-04-01 NOTE — PROGRESS NOTES
New Pregnancy Visit    Subjective   Chief Complaint   Patient presents with    Initial Prenatal Visit     Karmen Tracey is a 34 y.o.  at 10w5d, Estimated Date of Delivery: 10/23/25 by LMP c/w US today. She presents to initiate prenatal care with our group today.     Doing well today. Has had nausea without emesis. Reports a one time episode of spotting that has not recurred.    OB/GYN Hx:   OB History    Para Term  AB Living   3 2 2 0 0 2   SAB IAB Ectopic Molar Multiple Live Births   0 0 0 0 0 2      # Outcome Date GA Lbr Harmeet/2nd Weight Sex Type Anes PTL Lv   3 Current            2 Term 23 39w1d 10:38 / 00:10 3204 g (7 lb 1 oz) F Vag-Spont Spinal N AUTUMN   1 Term 19 38w4d / 00:22 2863 g (6 lb 5 oz) M Vag-Spont EPI N AUTUMN      Other pertinent history:   Past Medical History:   Diagnosis Date    Anxiety     Chronic hypertension     History of Papanicolaou smear of cervix 2016    normal    Migraine     Oral contraceptive use     Personal history of leukemia     Positive testing for group B Streptococcus 10/25/2018      Past Surgical History:   Procedure Laterality Date    ADENOIDECTOMY      BREAST BIOPSY      BREAST LUMPECTOMY Left 2018    benign     TONSILLECTOMY  1996    WISDOM TOOTH EXTRACTION        Social History    Tobacco Use      Smoking status: Never      Smokeless tobacco: Never    Current Outpatient Medications on File Prior to Visit   Medication Sig Dispense Refill    aspirin 81 MG chewable tablet Chew 1 tablet Daily.      fluticasone (FLONASE) 50 MCG/ACT nasal spray       Prenatal Vit-Fe Fumarate-FA (Prenatal Vitamin) 27-0.8 MG tablet Take 1 tablet by mouth Daily.      [DISCONTINUED] labetalol (NORMODYNE) 100 MG tablet Take 1 tablet by mouth.      [DISCONTINUED] Multivitamin Adults 50+ tablet tablet       cetirizine (ZYRTEC ALLERGY) 10 MG tablet Take  by mouth daily.      [DISCONTINUED] Multi-Vitamin tablet tablet Take 1 tablet by mouth Daily.       No current  facility-administered medications on file prior to visit.        Objective   /88   Wt 67.9 kg (149 lb 12.8 oz)   LMP 01/16/2025   BMI 26.54 kg/m²   Physical Exam:  See prenatal physical    Lab Review:   Pap 02/2024: ASCUS, HRHPV (-)     Imaging Review:   US performed today -   IUP measuring 10w6d with appropriate fetal cardiac activity. CLARE is set at 10/23/25, consistent with 10w5d today, by last menstrual period. The cervix and bilateral ovaries are normal in appearance. No free fluid in the cul-de-sac.        Assessment & Plan     IUP at 10w5d  Routine OB -   Prenatal labs ordered today  Pap smear is up to date - completed 2/1/24, ASCUS and HRHPV (-) --> next due 02/2027  Genetic testing reviewed:  cfDNA ordered  Information reviewed/ provided: exercise in pregnancy, nutrition in pregnancy, weight gain in pregnancy, work and travel restrictions during pregnancy, list of OTC medications acceptable in pregnancy, and call coverage groups  CHTN: was previously on labetalol 100mg BID (started 10/2024), stopped just prior to pregnancy. Reports BP at home has been well controlled. Discussed goal of maintaining < 140/90. On bASA QD - continue. Baseline pre-e labs ordered. Plan twice weekly ANT beginning 32 wks.  Nausea, difficulty sleeping: Unisom/ B6 sent to pharmacy     Diagnosis Plan   1. Prenatal care, first trimester  OB Panel With HIV and RPR    Urine Drug Screen - Urine, Clean Catch    Chlamydia trachomatis, Neisseria gonorrhoeae, PCR - Urine, Urine, Clean Catch    [id number] PLUS Core+SCA - Blood,      2. Encounter to determine fetal viability of pregnancy, single or unspecified fetus  US Ob < 14 Weeks Single or First Gestation      3. Chronic hypertension affecting pregnancy  Comprehensive Metabolic Panel    Protein / Creatinine Ratio, Urine - Urine, Clean Catch      4. Pregnancy related nausea, antepartum  doxylamine (UNISOM) 25 MG tablet    pyridoxine (VITAMIN B-6) 25 MG tablet        Follow up: 4  week(s)    Lorena Saaevdra MD  Obstetrics and Gynecology  [facility]

## 2025-04-05 LAB
ABO GROUP BLD: ABNORMAL
ALBUMIN SERPL-MCNC: 4.5 G/DL (ref 3.9–4.9)
ALP SERPL-CCNC: 51 IU/L (ref 44–121)
ALT SERPL-CCNC: 11 IU/L (ref 0–32)
AMPHETAMINES UR QL SCN: NEGATIVE NG/ML
AST SERPL-CCNC: 17 IU/L (ref 0–40)
BARBITURATES UR QL SCN: NEGATIVE NG/ML
BASOPHILS # BLD AUTO: 0 X10E3/UL (ref 0–0.2)
BASOPHILS NFR BLD AUTO: 0 %
BENZODIAZ UR QL SCN: NEGATIVE NG/ML
BILIRUB SERPL-MCNC: <0.2 MG/DL (ref 0–1.2)
BLD GP AB SCN SERPL QL: NEGATIVE
BUN SERPL-MCNC: 10 MG/DL (ref 6–20)
BUN/CREAT SERPL: 15 (ref 9–23)
BZE UR QL SCN: NEGATIVE NG/ML
C TRACH RRNA SPEC QL NAA+PROBE: NEGATIVE
CALCIUM SERPL-MCNC: 9.5 MG/DL (ref 8.7–10.2)
CANNABINOIDS UR QL SCN: NEGATIVE NG/ML
CFDNA.FET/CFDNA.TOTAL SFR FETUS: NORMAL %
CHLORIDE SERPL-SCNC: 101 MMOL/L (ref 96–106)
CITATION REF LAB TEST: NORMAL
CO2 SERPL-SCNC: 21 MMOL/L (ref 20–29)
CREAT SERPL-MCNC: 0.65 MG/DL (ref 0.57–1)
CREAT UR-MCNC: 53 MG/DL
CREAT UR-MCNC: 54.5 MG/DL (ref 20–300)
EGFRCR SERPLBLD CKD-EPI 2021: 119 ML/MIN/1.73
EOSINOPHIL # BLD AUTO: 0.1 X10E3/UL (ref 0–0.4)
EOSINOPHIL NFR BLD AUTO: 1 %
ERYTHROCYTE [DISTWIDTH] IN BLOOD BY AUTOMATED COUNT: 12.8 % (ref 11.7–15.4)
FET 13+18+21+X+Y ANEUP PLAS.CFDNA: NEGATIVE
FET CHR 21 TS PLAS.CFDNA QL: NEGATIVE
FET MS X RISK WBC.DNA+CFDNA QL: NOT DETECTED
FET SEX PLAS.CFDNA DOSAGE CFDNA: NORMAL
FET TS 13 RISK PLAS.CFDNA QL: NEGATIVE
FET TS 18 RISK WBC.DNA+CFDNA QL: NEGATIVE
FET X + Y ANEUP RISK PLAS.CFDNA SEQ-IMP: NOT DETECTED
GA EST FROM CONCEPTION DATE: NORMAL D
GESTATIONAL AGE > 9:: YES
GLOBULIN SER CALC-MCNC: 3 G/DL (ref 1.5–4.5)
GLUCOSE SERPL-MCNC: 87 MG/DL (ref 70–99)
HBV SURFACE AG SERPL QL IA: NEGATIVE
HCT VFR BLD AUTO: 38.5 % (ref 34–46.6)
HCV AB SERPL QL IA: NON REACTIVE
HCV AB SERPL QL IA: NORMAL
HGB BLD-MCNC: 13 G/DL (ref 11.1–15.9)
HIV 1+2 AB+HIV1 P24 AG SERPL QL IA: NON REACTIVE
IMM GRANULOCYTES # BLD AUTO: 0.1 X10E3/UL (ref 0–0.1)
IMM GRANULOCYTES NFR BLD AUTO: 1 %
LAB DIRECTOR NAME PROVIDER: NORMAL
LAB DIRECTOR NAME PROVIDER: NORMAL
LABORATORY COMMENT REPORT: NORMAL
LABORATORY COMMENT REPORT: NORMAL
LIMITATIONS OF THE TEST: NORMAL
LYMPHOCYTES # BLD AUTO: 1.2 X10E3/UL (ref 0.7–3.1)
LYMPHOCYTES NFR BLD AUTO: 13 %
MCH RBC QN AUTO: 31.5 PG (ref 26.6–33)
MCHC RBC AUTO-ENTMCNC: 33.8 G/DL (ref 31.5–35.7)
MCV RBC AUTO: 93 FL (ref 79–97)
METHADONE UR QL SCN: NEGATIVE NG/ML
MONOCYTES # BLD AUTO: 0.4 X10E3/UL (ref 0.1–0.9)
MONOCYTES NFR BLD AUTO: 4 %
N GONORRHOEA RRNA SPEC QL NAA+PROBE: NEGATIVE
NEGATIVE PREDICTIVE VALUE: NORMAL
NEUTROPHILS # BLD AUTO: 7.5 X10E3/UL (ref 1.4–7)
NEUTROPHILS NFR BLD AUTO: 81 %
OPIATES UR QL SCN: NEGATIVE NG/ML
OXYCODONE+OXYMORPHONE UR QL SCN: NEGATIVE NG/ML
PCP UR QL: NEGATIVE NG/ML
PERFORMANCE CHARACTERISTICS: NORMAL
PH UR: 7.7 [PH] (ref 4.5–8.9)
PLATELET # BLD AUTO: 351 X10E3/UL (ref 150–450)
POSITIVE PREDICTIVE VALUE: NORMAL
POTASSIUM SERPL-SCNC: 4.3 MMOL/L (ref 3.5–5.2)
PROPOXYPH UR QL SCN: NEGATIVE NG/ML
PROT SERPL-MCNC: 7.5 G/DL (ref 6–8.5)
PROT UR-MCNC: 6.9 MG/DL
PROT/CREAT UR: 130 MG/G CREAT (ref 0–200)
RBC # BLD AUTO: 4.13 X10E6/UL (ref 3.77–5.28)
REF LAB TEST METHOD: NORMAL
RH BLD: POSITIVE
RPR SER QL: NON REACTIVE
RUBV IGG SERPL IA-ACNC: 1.33 INDEX
SERVICE CMNT-IMP: NORMAL
SODIUM SERPL-SCNC: 137 MMOL/L (ref 134–144)
TEST PERFORMANCE INFO SPEC: NORMAL
WBC # BLD AUTO: 9.3 X10E3/UL (ref 3.4–10.8)

## 2025-05-01 ENCOUNTER — ROUTINE PRENATAL (OUTPATIENT)
Dept: OBSTETRICS AND GYNECOLOGY | Facility: CLINIC | Age: 35
End: 2025-05-01
Payer: COMMERCIAL

## 2025-05-01 VITALS — DIASTOLIC BLOOD PRESSURE: 66 MMHG | BODY MASS INDEX: 26.75 KG/M2 | WEIGHT: 151 LBS | SYSTOLIC BLOOD PRESSURE: 128 MMHG

## 2025-05-01 DIAGNOSIS — R11.0 NAUSEA WITHOUT VOMITING: ICD-10-CM

## 2025-05-01 DIAGNOSIS — Z34.82 ENCOUNTER FOR SUPERVISION OF OTHER NORMAL PREGNANCY, SECOND TRIMESTER: Primary | ICD-10-CM

## 2025-05-01 DIAGNOSIS — O10.919 CHRONIC HYPERTENSION AFFECTING PREGNANCY: ICD-10-CM

## 2025-05-01 DIAGNOSIS — K21.9 GASTROESOPHAGEAL REFLUX DISEASE WITHOUT ESOPHAGITIS: ICD-10-CM

## 2025-05-01 NOTE — PROGRESS NOTES
Chief Complaint  Routine Prenatal Visit (No complaints. )    History of Present Illness:  Karmen is a  currently at 15w0d who presents today with no significant complaints.  Patient has had positive nausea but reports it has improved.  She denies any emesis.  She does have reflux but has been taking Tums.  She has been taking her prenatal vitamins.  She has been taking her baby aspirin as well.  She does report positive fetal movement over the last 3 to 4 days.  She did have an episode of spotting following her last visit.  She now has gone for several weeks without any spotting or bleeding.  She did have previous labs last visit including genetic screening.    Exam:  Vitals:  See prenatal flowsheet as noted and reviewed  General: Alert, cooperative, and does not appear in any distress  Abdomen:   See prenatal flowsheet as noted and reviewed    Uterus gravid, non-tender; no palpable masses    No guarding or rebound tenderness  Pelvic:  See prenatal flowsheet as noted and reviewed  Ext:  See prenatal flowsheet as noted and reviewed    Moves extremities well, no cyanosis and no redness  Urine:  See prenatal flowsheet as noted and reviewed    Data Review:  The following data was reviewed by: Velma Sosa MD on 2025:  Prenatal Labs:  Lab Results   Component Value Date    HGB 13.0 2025    RUBELLAABIGG 1.33 2025    HEPBSAG Negative 2025    ABO A 2025    RH Positive 2025    ABSCRN Negative 2025    [id number] Non Reactive 2025    HEPCVIRUSABY <0.1 2021    STREPGPB Positive (A) 02/15/2022    URINECX Final report 2021       No visits with results within 1 Month(s) from this visit.   Latest known visit with results is:   Initial Prenatal on 2025   Component Date Value    Hepatitis B Surface Ag 2025 Negative     Hepatitis C Ab 2025 Non Reactive     RPR 2025 Non Reactive     Rubella Antibodies, IgG 2025 1.33     ABO Type 2025  A     Rh Factor 04/01/2025 Positive     Antibody Screen 04/01/2025 Negative     HIV Screen 4th Gen w/RFX* 04/01/2025 Non Reactive     WBC 04/01/2025 9.3     RBC 04/01/2025 4.13     Hemoglobin 04/01/2025 13.0     Hematocrit 04/01/2025 38.5     MCV 04/01/2025 93     MCH 04/01/2025 31.5     MCHC 04/01/2025 33.8     RDW 04/01/2025 12.8     Platelets 04/01/2025 351     Neutrophil Rel % 04/01/2025 81     Lymphocyte Rel % 04/01/2025 13     Monocyte Rel % 04/01/2025 4     Eosinophil Rel % 04/01/2025 1     Basophil Rel % 04/01/2025 0     Neutrophils Absolute 04/01/2025 7.5 (H)     Lymphocytes Absolute 04/01/2025 1.2     Monocytes Absolute 04/01/2025 0.4     Eosinophils Absolute 04/01/2025 0.1     Basophils Absolute 04/01/2025 0.0     Immature Granulocyte Rel* 04/01/2025 1     Immature Grans Absolute 04/01/2025 0.1     Amphetamine, Urine Qual 04/01/2025 Negative     Barbiturates Screen, Uri* 04/01/2025 Negative     Benzodiazepine Screen, U* 04/01/2025 Negative     THC Screen, Urine 04/01/2025 Negative     Cocaine Screen, Urine 04/01/2025 Negative     Opiate Screen, Urine 04/01/2025 Negative     Oxycodone/Oxymorphone, U* 04/01/2025 Negative     Phencyclidine (PCP), Uri* 04/01/2025 Negative     Methadone Screen, Urine 04/01/2025 Negative     Propoxyphene Screen 04/01/2025 Negative     Creatinine, Urine 04/01/2025 54.5     pH, UA 04/01/2025 7.7     Please note 04/01/2025 Comment     Chlamydia trachomatis, N* 04/01/2025 Negative     Neisseria gonorrhoeae, N* 04/01/2025 Negative     Glucose 04/01/2025 87     BUN 04/01/2025 10     Creatinine 04/01/2025 0.65     EGFR Result 04/01/2025 119     BUN/Creatinine Ratio 04/01/2025 15     Sodium 04/01/2025 137     Potassium 04/01/2025 4.3     Chloride 04/01/2025 101     Total CO2 04/01/2025 21     Calcium 04/01/2025 9.5     Total Protein 04/01/2025 7.5     Albumin 04/01/2025 4.5     Globulin 04/01/2025 3.0     Total Bilirubin 04/01/2025 <0.2     Alkaline Phosphatase 04/01/2025 51     AST  (SGOT) 04/01/2025 17     ALT (SGPT) 04/01/2025 11     Creatinine, Urine 04/01/2025 53.0     Total Protein, Urine 04/01/2025 6.9     Protein/Creatinine Ratio 04/01/2025 130     Interpretation 04/01/2025 Comment     Gestation 04/01/2025 Gar[place]     Fetal Fraction 04/01/2025 10%     Gestational Age >9: 04/01/2025 Yes     Result 04/01/2025 Negative      Comments 04/01/2025 Comment     Approved By 04/01/2025 Comment     TRISOMY 21 (DOWN SYNDROM* 04/01/2025 Negative     TRISOMY 18 (NGUYEN SYND* 04/01/2025 Negative     TRISOMY 13 (PATAU SYNDRO* 04/01/2025 Negative     FETAL SEX 04/01/2025 Comment     MONOSOMY X (ESTES SYNDR* 04/01/2025 Not Detected     XYY (SHARMA SYNDROME) 04/01/2025 Not Detected     XXY (KLINEFELTER SYNDROM* 04/01/2025 Not Detected     XXX (TRIPLE X SYNDROME) 04/01/2025 Not Detected     NEGATIVE PREDICTIVE VALUE 04/01/2025 Note     POSITIVE PREDICTIVE VALUE 04/01/2025 N/A     About The Test 04/01/2025 Comment     Test Method 04/01/2025 Comment     Performance 04/01/2025 Comment     PERFORMANCE CHARACTERIST* 04/01/2025 Note     Limitations of the Test 04/01/2025 Comment     Note 04/01/2025 Comment     References 04/01/2025 Comment      Imaging:  US Ob < 14 Weeks Single or First Gestation  Impression:   IUP measuring 10w6d with appropriate fetal cardiac activity. CLARE is set at   10/23/25, consistent with 10w5d today, by last menstrual period. The   cervix and bilateral ovaries are normal in appearance. No free fluid in   the cul-de-sac.    Lorena Saavedra MD   Obstetrics and Gynecology  [facility]      Medical Records:  None    Assessment and Plan:  Problem List Items Addressed This Visit    None  Visit Diagnoses         Encounter for supervision of other normal pregnancy, second trimester    -  Primary  Topics discussed:     ab precautions  genetic screening - Today we discussed genetic testing.  She is aware that the MSAFP-4 is a screening test.  A screening test is not a  diagnostic test.  This means that a negative test does not guarantee an unaffected fetus and a positive test does not mean the fetus has the condition for which the test is being performed.  If the test returns positive, a diagnostic test should be consider to determine if the fetus in fact has the condition.  After considering the options previously presented, she is still undecided if she is interested in having genetic testing performed.  GERD management  kick counts and fetal movement  PIH precautions  Anatomic scan next visit    Relevant Orders    US Ob 14 + Weeks Single or First Gestation      Chronic hypertension affecting pregnancy      Patient to continue with her aspirin 81 mg daily.      Nausea without vomiting      Patient to call if worsening and/or changes in her symptoms.  Patient may continue with her Unisom and vitamin B6 as needed.      Gastroesophageal reflux disease without esophagitis      Landaverde's options have been discussed with patient.  Patient may continue with her Tums.  Patient to call if worsening and/or changes in her symptoms.          Follow Up/Instructions:  Follow up as scheduled.  Patient was given instructions and counseling regarding her condition or for health maintenance advice. Please see specific information pulled into the AVS if appropriate.     Note: Speech recognition transcription software may have been used to dictate portions of this document.  An attempt at proofreading has been made though minor errors in transcription may still be present.    This note was electronically signed.  Velma Sosa M.D.

## 2025-05-13 RX ORDER — MICONAZOLE NITRATE 2 %
1 CREAM WITH APPLICATOR VAGINAL NIGHTLY
Qty: 45 G | Refills: 0 | Status: SHIPPED | OUTPATIENT
Start: 2025-05-13 | End: 2025-05-20

## 2025-05-22 ENCOUNTER — ROUTINE PRENATAL (OUTPATIENT)
Dept: OBSTETRICS AND GYNECOLOGY | Facility: CLINIC | Age: 35
End: 2025-05-22
Payer: COMMERCIAL

## 2025-05-22 VITALS — DIASTOLIC BLOOD PRESSURE: 82 MMHG | SYSTOLIC BLOOD PRESSURE: 122 MMHG | WEIGHT: 155.6 LBS | BODY MASS INDEX: 27.56 KG/M2

## 2025-05-22 DIAGNOSIS — K21.9 GASTROESOPHAGEAL REFLUX DISEASE WITHOUT ESOPHAGITIS: ICD-10-CM

## 2025-05-22 DIAGNOSIS — N89.8 VAGINAL ITCHING: ICD-10-CM

## 2025-05-22 DIAGNOSIS — Z34.82 ENCOUNTER FOR SUPERVISION OF OTHER NORMAL PREGNANCY, SECOND TRIMESTER: Primary | ICD-10-CM

## 2025-05-22 DIAGNOSIS — O10.919 CHRONIC HYPERTENSION AFFECTING PREGNANCY: ICD-10-CM

## 2025-05-22 NOTE — PROGRESS NOTES
Chief Complaint  Pregnancy Ultrasound (Anatomy scan done today)    History of Present Illness:  Karmen is a  currently at 18w0d who presents today with complaints of recent yeast infection.  Patient has been having vaginal discharge as well as itching and irritation.  She did take Monistat for 7 days.  She reports this is the first yeast infection she has had.  She has been taking her prenatal vitamins.  She is continued on her baby aspirin.  She does report her reflux has worsened but she has continued with her Tums.  She has not had any significant nausea or emesis.  She has noticed positive fetal movement over the last 2 weeks.  She denies any vaginal bleeding or spotting.    Exam:  Vitals:  See prenatal flowsheet as noted and reviewed  General: Alert, cooperative, and does not appear in any distress  Abdomen:   See prenatal flowsheet as noted and reviewed    Uterus gravid, non-tender; no palpable masses    No guarding or rebound tenderness  Pelvic:  See prenatal flowsheet as noted and reviewed  Ext:  See prenatal flowsheet as noted and reviewed    Moves extremities well, no cyanosis and no redness  Urine:  See prenatal flowsheet as noted and reviewed    Data Review:  The following data was reviewed by: Velma Sosa MD on 2025:  Prenatal Labs:  Lab Results   Component Value Date    HGB 13.0 2025    RUBELLAABIGG 1.33 2025    HEPBSAG Negative 2025    ABO A 2025    RH Positive 2025    ABSCRN Negative 2025    [id number] Non Reactive 2025    HEPCVIRUSABY <0.1 2021    STREPGPB Positive (A) 02/15/2022    URINECX Final report 2021       No visits with results within 1 Month(s) from this visit.   Latest known visit with results is:   Initial Prenatal on 2025   Component Date Value    Hepatitis B Surface Ag 2025 Negative     Hepatitis C Ab 2025 Non Reactive     RPR 2025 Non Reactive     Rubella Antibodies, IgG 2025 1.33      ABO Type 04/01/2025 A     Rh Factor 04/01/2025 Positive     Antibody Screen 04/01/2025 Negative     HIV Screen 4th Gen w/RFX* 04/01/2025 Non Reactive     WBC 04/01/2025 9.3     RBC 04/01/2025 4.13     Hemoglobin 04/01/2025 13.0     Hematocrit 04/01/2025 38.5     MCV 04/01/2025 93     MCH 04/01/2025 31.5     MCHC 04/01/2025 33.8     RDW 04/01/2025 12.8     Platelets 04/01/2025 351     Neutrophil Rel % 04/01/2025 81     Lymphocyte Rel % 04/01/2025 13     Monocyte Rel % 04/01/2025 4     Eosinophil Rel % 04/01/2025 1     Basophil Rel % 04/01/2025 0     Neutrophils Absolute 04/01/2025 7.5 (H)     Lymphocytes Absolute 04/01/2025 1.2     Monocytes Absolute 04/01/2025 0.4     Eosinophils Absolute 04/01/2025 0.1     Basophils Absolute 04/01/2025 0.0     Immature Granulocyte Rel* 04/01/2025 1     Immature Grans Absolute 04/01/2025 0.1     Amphetamine, Urine Qual 04/01/2025 Negative     Barbiturates Screen, Uri* 04/01/2025 Negative     Benzodiazepine Screen, U* 04/01/2025 Negative     THC Screen, Urine 04/01/2025 Negative     Cocaine Screen, Urine 04/01/2025 Negative     Opiate Screen, Urine 04/01/2025 Negative     Oxycodone/Oxymorphone, U* 04/01/2025 Negative     Phencyclidine (PCP), Uri* 04/01/2025 Negative     Methadone Screen, Urine 04/01/2025 Negative     Propoxyphene Screen 04/01/2025 Negative     Creatinine, Urine 04/01/2025 54.5     pH, UA 04/01/2025 7.7     Please note 04/01/2025 Comment     Chlamydia trachomatis, N* 04/01/2025 Negative     Neisseria gonorrhoeae, N* 04/01/2025 Negative     Glucose 04/01/2025 87     BUN 04/01/2025 10     Creatinine 04/01/2025 0.65     EGFR Result 04/01/2025 119     BUN/Creatinine Ratio 04/01/2025 15     Sodium 04/01/2025 137     Potassium 04/01/2025 4.3     Chloride 04/01/2025 101     Total CO2 04/01/2025 21     Calcium 04/01/2025 9.5     Total Protein 04/01/2025 7.5     Albumin 04/01/2025 4.5     Globulin 04/01/2025 3.0     Total Bilirubin 04/01/2025 <0.2     Alkaline Phosphatase  2025 51     AST (SGOT) 2025 17     ALT (SGPT) 2025 11     Creatinine, Urine 2025 53.0     Total Protein, Urine 2025 6.9     Protein/Creatinine Ratio 2025 130     Interpretation 2025 Comment     Gestation 2025 Garvey     Fetal Fraction 2025 10%     Gestational Age >9: 2025 Yes     Result 2025 Negative      Comments 2025 Comment     Approved By 2025 Comment     TRISOMY 21 (DOWN SYNDROM* 2025 Negative     TRISOMY 18 (NGUYEN SYND* 2025 Negative     TRISOMY 13 (PATAU SYNDRO* 2025 Negative     FETAL SEX 2025 Comment     MONOSOMY X (ESTES SYNDR* 2025 Not Detected     XYY (SHARMA SYNDROME) 2025 Not Detected     XXY (KLINEFELTER SYNDROM* 2025 Not Detected     XXX (TRIPLE X SYNDROME) 2025 Not Detected     NEGATIVE PREDICTIVE VALUE 2025 Note     POSITIVE PREDICTIVE VALUE 2025 N/A     About The Test 2025 Comment     Test Method 2025 Comment     Performance 2025 Comment     PERFORMANCE CHARACTERIST* 2025 Note     Limitations of the Test 2025 Comment     Note 2025 Comment     References 2025 Comment      Imaging:  US Ob 14 + Weeks Single or First Gestation  Karmen Tracey  : 1990  MRN: [id number]  Date: 2025    Reason for exam/History:  Anatomic Survey    Ultrasound images are reviewed.  There is noted to be a viable   intrauterine pregnancy.   The pregnancy is measuring 18 weeks 2 days   gestation.  The fetal heart rate was normal.  Normal anatomy was noted.   The placental location was noted to be anterior.  The amniotic fluid was   normal.    The exam limitations noted:  none    See the official report for actual measurements and structures seen.    Velma Sosa MD, RDMS  [facility]  OB GYN [place]      Medical Records:  None    Assessment and Plan:  Problem List Items Addressed This Visit     None  Visit Diagnoses         Encounter for supervision of other normal pregnancy, second trimester    -  Primary  Topics discussed:     genetic screening - Today we discussed genetic testing.  She is aware that the MSAFP-4 is a screening test.  A screening test is not a diagnostic test.  This means that a negative test does not guarantee an unaffected fetus and a positive test does not mean the fetus has the condition for which the test is being performed.  If the test returns positive, a diagnostic test should be consider to determine if the fetus in fact has the condition.  After considering the options previously presented, she is not interested in having genetic testing performed.  GERD management  kick counts and fetal movement  PIH precautions  Anatomic scan today as noted.  Patient informed regarding those findings.      Chronic hypertension affecting pregnancy      Patient to continue her 81 mg of aspirin daily.      Gastroesophageal reflux disease without esophagitis      Patient to continue her Tums.  Patient to call if worsening and/or changes in her symptoms as discussed.      Vaginal itching      Patient to complete her miconazole 1 application qhs x 7 d.    Patient to call if worsening and/or changes in her symptoms.  Instructions and precautions have been given.          Follow Up/Instructions:  Follow up as scheduled.  Patient was given instructions and counseling regarding her condition or for health maintenance advice. Please see specific information pulled into the AVS if appropriate.     Note: Speech recognition transcription software may have been used to dictate portions of this document.  An attempt at proofreading has been made though minor errors in transcription may still be present.    This note was electronically signed.  Velma Sosa M.D.

## 2025-06-23 ENCOUNTER — ROUTINE PRENATAL (OUTPATIENT)
Dept: OBSTETRICS AND GYNECOLOGY | Facility: CLINIC | Age: 35
End: 2025-06-23
Payer: COMMERCIAL

## 2025-06-23 VITALS — BODY MASS INDEX: 29.09 KG/M2 | DIASTOLIC BLOOD PRESSURE: 82 MMHG | SYSTOLIC BLOOD PRESSURE: 140 MMHG | WEIGHT: 164.2 LBS

## 2025-06-23 DIAGNOSIS — Z34.92 SECOND TRIMESTER PREGNANCY: Primary | ICD-10-CM

## 2025-06-23 PROCEDURE — 99213 OFFICE O/P EST LOW 20 MIN: CPT | Performed by: OBSTETRICS & GYNECOLOGY

## 2025-06-23 NOTE — PROGRESS NOTES
Chief Complaint   Patient presents with    Routine Prenatal Visit     Prenatal visit with no problems or concerns        HPI:   , 22w4d gestation reports doing well    ROS:  See Prenatal Episode/Flowsheet  /82   Wt 74.5 kg (164 lb 3.2 oz)   LMP 2025   BMI 29.09 kg/m²      EXAM:  EXTREMITIES:  No swelling-See Prenatal Episode/Flowsheet    ABDOMEN:  FHTs/Movement noted-See Prenatal Episode/Flowsheet    URINE GLUCOSE/PROTEIN:  See Prenatal Episode/Flowsheet    PELVIC EXAM:  See Prenatal Episode/Flowsheet  CV:  Lungs:  GYN:    MDM:    Lab Results   Component Value Date    HGB 13.0 2025    RUBELLAABIGG 1.33 2025    HEPBSAG Negative 2025    ABO A 2025    RH Positive 2025    ABSCRN Negative 2025    [id number] Non Reactive 2025    HEPCVIRUSABY <0.1 2021    STREPGPB Positive (A) 02/15/2022    URINECX Final report 2021       U/S:US Ob 14 + Weeks Single or First Gestation (2025 14:38)     1. IUP 22w4d  2. Routine care   3. Glucola CBC next time  4. CHTN: BP's running normal at home

## 2025-07-21 ENCOUNTER — ROUTINE PRENATAL (OUTPATIENT)
Dept: OBSTETRICS AND GYNECOLOGY | Facility: CLINIC | Age: 35
End: 2025-07-21
Payer: COMMERCIAL

## 2025-07-21 VITALS — SYSTOLIC BLOOD PRESSURE: 124 MMHG | BODY MASS INDEX: 29.58 KG/M2 | WEIGHT: 167 LBS | DIASTOLIC BLOOD PRESSURE: 72 MMHG

## 2025-07-21 DIAGNOSIS — Z34.82 ENCOUNTER FOR SUPERVISION OF OTHER NORMAL PREGNANCY, SECOND TRIMESTER: Primary | ICD-10-CM

## 2025-07-21 DIAGNOSIS — R35.0 URINARY FREQUENCY: ICD-10-CM

## 2025-07-21 DIAGNOSIS — K21.9 GASTROESOPHAGEAL REFLUX DISEASE WITHOUT ESOPHAGITIS: ICD-10-CM

## 2025-07-21 DIAGNOSIS — R39.9 URINARY TRACT INFECTION SYMPTOMS: ICD-10-CM

## 2025-07-21 DIAGNOSIS — O10.919 CHRONIC HYPERTENSION AFFECTING PREGNANCY: ICD-10-CM

## 2025-07-21 RX ORDER — PANTOPRAZOLE SODIUM 40 MG/1
40 TABLET, DELAYED RELEASE ORAL DAILY
Qty: 30 TABLET | Refills: 6 | Status: ON HOLD | OUTPATIENT
Start: 2025-07-21 | End: 2025-07-27

## 2025-07-21 NOTE — PROGRESS NOTES
Chief Complaint  Routine Prenatal Visit (Glucola done today, patient complains about urinary frequency and urgency. )    History of Present Illness:  Karmen is a  currently at 26w4d who presents today with complaints of urinary frequency and urgency.  Patient will then have hesitancy and only dribbling.  She denies any dysuria.  She denies any fever or chills.  She does report good fetal movement.  She denies any significant cramping or contractions.  She does have complaints of reflux.  She has been monitoring her blood pressure at home.  She has remained on her baby aspirin.    Exam:  Vitals:  See prenatal flowsheet as noted and reviewed  General: Alert, cooperative, and does not appear in any distress  Abdomen:   See prenatal flowsheet as noted and reviewed    Uterus gravid, non-tender; no palpable masses    No guarding or rebound tenderness  Pelvic:  See prenatal flowsheet as noted and reviewed  Ext:  See prenatal flowsheet as noted and reviewed    Moves extremities well, no cyanosis and no redness  Urine:  See prenatal flowsheet as noted and reviewed    Data Review:  The following data was reviewed by: Velma Sosa MD on 2025:  Prenatal Labs:  Lab Results   Component Value Date    HGB 13.0 2025    RUBELLAABIGG 1.33 2025    HEPBSAG Negative 2025    ABO A 2025    RH Positive 2025    ABSCRN Negative 2025    [id number] Non Reactive 2025    HEPCVIRUSABY <0.1 2021    STREPGPB Positive (A) 02/15/2022    URINECX Final report 2021       No visits with results within 1 Month(s) from this visit.   Latest known visit with results is:   Initial Prenatal on 2025   Component Date Value    Hepatitis B Surface Ag 2025 Negative     Hepatitis C Ab 2025 Non Reactive     RPR 2025 Non Reactive     Rubella Antibodies, IgG 2025 1.33     ABO Type 2025 A     Rh Factor 2025 Positive     Antibody Screen 2025 Negative     HIV  Screen 4th Gen w/RFX* 04/01/2025 Non Reactive     WBC 04/01/2025 9.3     RBC 04/01/2025 4.13     Hemoglobin 04/01/2025 13.0     Hematocrit 04/01/2025 38.5     MCV 04/01/2025 93     MCH 04/01/2025 31.5     MCHC 04/01/2025 33.8     RDW 04/01/2025 12.8     Platelets 04/01/2025 351     Neutrophil Rel % 04/01/2025 81     Lymphocyte Rel % 04/01/2025 13     Monocyte Rel % 04/01/2025 4     Eosinophil Rel % 04/01/2025 1     Basophil Rel % 04/01/2025 0     Neutrophils Absolute 04/01/2025 7.5 (H)     Lymphocytes Absolute 04/01/2025 1.2     Monocytes Absolute 04/01/2025 0.4     Eosinophils Absolute 04/01/2025 0.1     Basophils Absolute 04/01/2025 0.0     Immature Granulocyte Rel* 04/01/2025 1     Immature Grans Absolute 04/01/2025 0.1     Amphetamine, Urine Qual 04/01/2025 Negative     Barbiturates Screen, Uri* 04/01/2025 Negative     Benzodiazepine Screen, U* 04/01/2025 Negative     THC Screen, Urine 04/01/2025 Negative     Cocaine Screen, Urine 04/01/2025 Negative     Opiate Screen, Urine 04/01/2025 Negative     Oxycodone/Oxymorphone, U* 04/01/2025 Negative     Phencyclidine (PCP), Uri* 04/01/2025 Negative     Methadone Screen, Urine 04/01/2025 Negative     Propoxyphene Screen 04/01/2025 Negative     Creatinine, Urine 04/01/2025 54.5     pH, UA 04/01/2025 7.7     Please note 04/01/2025 Comment     Chlamydia trachomatis, N* 04/01/2025 Negative     Neisseria gonorrhoeae, N* 04/01/2025 Negative     Glucose 04/01/2025 87     BUN 04/01/2025 10     Creatinine 04/01/2025 0.65     EGFR Result 04/01/2025 119     BUN/Creatinine Ratio 04/01/2025 15     Sodium 04/01/2025 137     Potassium 04/01/2025 4.3     Chloride 04/01/2025 101     Total CO2 04/01/2025 21     Calcium 04/01/2025 9.5     Total Protein 04/01/2025 7.5     Albumin 04/01/2025 4.5     Globulin 04/01/2025 3.0     Total Bilirubin 04/01/2025 <0.2     Alkaline Phosphatase 04/01/2025 51     AST (SGOT) 04/01/2025 17     ALT (SGPT) 04/01/2025 11     Creatinine, Urine 04/01/2025  53.0     Total Protein, Urine 2025 6.9     Protein/Creatinine Ratio 2025 130     Interpretation 2025 Comment     Gestation 2025 Garvey     Fetal Fraction 2025 10%     Gestational Age >9: 2025 Yes     Result 2025 Negative      Comments 2025 Comment     Approved By 2025 Comment     TRISOMY 21 (DOWN SYNDROM* 2025 Negative     TRISOMY 18 (NGUYEN SYND* 2025 Negative     TRISOMY 13 (PATAU SYNDRO* 2025 Negative     FETAL SEX 2025 Comment     MONOSOMY X (ESTES SYNDR* 2025 Not Detected     XYY (SHARMA SYNDROME) 2025 Not Detected     XXY (KLINEFELTER SYNDROM* 2025 Not Detected     XXX (TRIPLE X SYNDROME) 2025 Not Detected     NEGATIVE PREDICTIVE VALUE 2025 Note     POSITIVE PREDICTIVE VALUE 2025 N/A     About The Test 2025 Comment     Test Method 2025 Comment     Performance 2025 Comment     PERFORMANCE CHARACTERIST* 2025 Note     Limitations of the Test 2025 Comment     Note 2025 Comment     References 2025 Comment      Imaging:  US Ob 14 + Weeks Single or First Gestation  Karmen Tracey  : 1990  MRN: [id number]  Date: 2025    Reason for exam/History:  Anatomic Survey    Ultrasound images are reviewed.  There is noted to be a viable   intrauterine pregnancy.   The pregnancy is measuring 18 weeks 2 days   gestation.  The fetal heart rate was normal.  Normal anatomy was noted.   The placental location was noted to be anterior.  The amniotic fluid was   normal.    The exam limitations noted:  none    See the official report for actual measurements and structures seen.    Velma Sosa MD, RDMS  [facility]  OB GYN [place]      Medical Records:  None    Assessment and Plan:  Problem List Items Addressed This Visit    None  Visit Diagnoses         Encounter for supervision of other normal pregnancy, second trimester    -   Primary  Topics discussed:     GERD management  iron supplementation  kick counts and fetal movement  PIH precautions   labor signs and symptoms  Labs today as noted    Relevant Orders    Gestational Screen 1 Hr ([facility])    CBC & Differential      Urinary tract infection symptoms        Relevant Orders    Urinalysis With Microscopic - Urine, Clean Catch    Urine Culture - Urine, Urine, Clean Catch      Urinary frequency      Patient to increase her p.o. fluids.  Will send urine for clean-catch UA culture and sensitivity.    Relevant Medications    pantoprazole (PROTONIX) 40 MG EC tablet    Other Relevant Orders    Urinalysis With Microscopic - Urine, Clean Catch    Urine Culture - Urine, Urine, Clean Catch      Gastroesophageal reflux disease without esophagitis      Prescription given for Protonix.  Patient to call if worsening and/or changes in her symptoms.    Relevant Medications    pantoprazole (PROTONIX) 40 MG EC tablet      Chronic hypertension affecting pregnancy      Patient to continue monitoring her blood pressure at home.  Patient to continue on her baby aspirin daily.          Follow Up/Instructions:  Follow up as scheduled.  Patient was given instructions and counseling regarding her condition or for health maintenance advice. Please see specific information pulled into the AVS if appropriate.     Note: Speech recognition transcription software may have been used to dictate portions of this document.  An attempt at proofreading has been made though minor errors in transcription may still be present.    This note was electronically signed.  Velma Sosa M.D.

## 2025-07-25 LAB
APPEARANCE UR: ABNORMAL
BACTERIA #/AREA URNS HPF: ABNORMAL /HPF
BACTERIA UR CULT: ABNORMAL
BASOPHILS # BLD AUTO: 0.02 10*3/MM3 (ref 0–0.2)
BASOPHILS NFR BLD AUTO: 0.2 % (ref 0–1.5)
BILIRUB UR QL STRIP: NEGATIVE
CASTS URNS MICRO: ABNORMAL
COLOR UR: YELLOW
EOSINOPHIL # BLD AUTO: 0.15 10*3/MM3 (ref 0–0.4)
EOSINOPHIL NFR BLD AUTO: 1.4 % (ref 0.3–6.2)
EPI CELLS #/AREA URNS HPF: ABNORMAL /HPF
ERYTHROCYTE [DISTWIDTH] IN BLOOD BY AUTOMATED COUNT: 12.5 % (ref 12.3–15.4)
GLUCOSE 1H P 50 G GLC PO SERPL-MCNC: 99 MG/DL (ref 65–139)
GLUCOSE UR QL STRIP: ABNORMAL
HCT VFR BLD AUTO: 34.7 % (ref 34–46.6)
HGB BLD-MCNC: 11.5 G/DL (ref 12–15.9)
HGB UR QL STRIP: NEGATIVE
IMM GRANULOCYTES # BLD AUTO: 0.1 10*3/MM3 (ref 0–0.05)
IMM GRANULOCYTES NFR BLD AUTO: 0.9 % (ref 0–0.5)
KETONES UR QL STRIP: NEGATIVE
LEUKOCYTE ESTERASE UR QL STRIP: ABNORMAL
LYMPHOCYTES # BLD AUTO: 1.16 10*3/MM3 (ref 0.7–3.1)
LYMPHOCYTES NFR BLD AUTO: 10.8 % (ref 19.6–45.3)
MCH RBC QN AUTO: 32 PG (ref 26.6–33)
MCHC RBC AUTO-ENTMCNC: 33.1 G/DL (ref 31.5–35.7)
MCV RBC AUTO: 96.7 FL (ref 79–97)
MONOCYTES # BLD AUTO: 0.5 10*3/MM3 (ref 0.1–0.9)
MONOCYTES NFR BLD AUTO: 4.7 % (ref 5–12)
NEUTROPHILS # BLD AUTO: 8.81 10*3/MM3 (ref 1.7–7)
NEUTROPHILS NFR BLD AUTO: 82 % (ref 42.7–76)
NITRITE UR QL STRIP: NEGATIVE
NRBC BLD AUTO-RTO: 0 /100 WBC (ref 0–0.2)
OTHER ANTIBIOTIC SUSC ISLT: ABNORMAL
PH UR STRIP: 7 [PH] (ref 5–8)
PLATELET # BLD AUTO: 281 10*3/MM3 (ref 140–450)
PROT UR QL STRIP: NEGATIVE
RBC # BLD AUTO: 3.59 10*6/MM3 (ref 3.77–5.28)
RBC #/AREA URNS HPF: ABNORMAL /HPF
SP GR UR STRIP: 1.01 (ref 1–1.03)
UROBILINOGEN UR STRIP-MCNC: ABNORMAL MG/DL
WBC # BLD AUTO: 10.74 10*3/MM3 (ref 3.4–10.8)
WBC #/AREA URNS HPF: ABNORMAL /HPF

## 2025-07-26 ENCOUNTER — HOSPITAL ENCOUNTER (OUTPATIENT)
Facility: HOSPITAL | Age: 35
Discharge: HOME | End: 2025-07-26
Attending: STUDENT IN AN ORGANIZED HEALTH CARE EDUCATION/TRAINING PROGRAM | Admitting: STUDENT IN AN ORGANIZED HEALTH CARE EDUCATION/TRAINING PROGRAM
Payer: COMMERCIAL

## 2025-07-26 ENCOUNTER — TELEPHONE (OUTPATIENT)
Dept: OBSTETRICS AND GYNECOLOGY | Facility: CLINIC | Age: 35
End: 2025-07-26
Payer: COMMERCIAL

## 2025-07-26 VITALS
TEMPERATURE: 99 F | OXYGEN SATURATION: 98 % | HEART RATE: 101 BPM | SYSTOLIC BLOOD PRESSURE: 132 MMHG | RESPIRATION RATE: 18 BRPM | DIASTOLIC BLOOD PRESSURE: 90 MMHG

## 2025-07-26 LAB
ALBUMIN SERPL-MCNC: 3.7 G/DL (ref 3.5–5.2)
ALBUMIN/GLOB SERPL: 1.2 G/DL
ALP SERPL-CCNC: 81 U/L (ref 39–117)
ALT SERPL W P-5'-P-CCNC: 14 U/L (ref 1–33)
ANION GAP SERPL CALCULATED.3IONS-SCNC: 15.3 MMOL/L (ref 5–15)
AST SERPL-CCNC: 20 U/L (ref 1–32)
BILIRUB SERPL-MCNC: 0.3 MG/DL (ref 0–1.2)
BUN SERPL-MCNC: 7 MG/DL (ref 6–20)
BUN/CREAT SERPL: 10.9 (ref 7–25)
CALCIUM SPEC-SCNC: 8.8 MG/DL (ref 8.6–10.5)
CHLORIDE SERPL-SCNC: 101 MMOL/L (ref 98–107)
CO2 SERPL-SCNC: 20.7 MMOL/L (ref 22–29)
CREAT SERPL-MCNC: 0.64 MG/DL (ref 0.57–1)
CREAT UR-MCNC: 107.6 MG/DL
DEPRECATED RDW RBC AUTO: 45.9 FL (ref 37–54)
EGFRCR SERPLBLD CKD-EPI 2021: 119.8 ML/MIN/1.73
ERYTHROCYTE [DISTWIDTH] IN BLOOD BY AUTOMATED COUNT: 13.4 % (ref 12.3–15.4)
GLOBULIN UR ELPH-MCNC: 3.1 GM/DL
GLUCOSE SERPL-MCNC: 90 MG/DL (ref 65–99)
HCT VFR BLD AUTO: 33.7 % (ref 34–46.6)
HGB BLD-MCNC: 11.2 G/DL (ref 12–15.9)
LYMPHOCYTES # BLD MANUAL: 1.83 10*3/MM3 (ref 0.7–3.1)
LYMPHOCYTES NFR BLD MANUAL: 3 % (ref 5–12)
MCH RBC QN AUTO: 31.5 PG (ref 26.6–33)
MCHC RBC AUTO-ENTMCNC: 33.2 G/DL (ref 31.5–35.7)
MCV RBC AUTO: 94.9 FL (ref 79–97)
MONOCYTES # BLD: 0.42 10*3/MM3 (ref 0.1–0.9)
NEUTROPHILS # BLD AUTO: 11.84 10*3/MM3 (ref 1.7–7)
NEUTROPHILS NFR BLD MANUAL: 82 % (ref 42.7–76)
NEUTS BAND NFR BLD MANUAL: 2 % (ref 0–5)
PLATELET # BLD AUTO: 275 10*3/MM3 (ref 140–450)
PMV BLD AUTO: 8.9 FL (ref 6–12)
POTASSIUM SERPL-SCNC: 3.7 MMOL/L (ref 3.5–5.2)
PROT ?TM UR-MCNC: 8 MG/DL
PROT SERPL-MCNC: 6.8 G/DL (ref 6–8.5)
PROT/CREAT UR: 74.3 MG/G CREA (ref 0–200)
RBC # BLD AUTO: 3.55 10*6/MM3 (ref 3.77–5.28)
RBC MORPH BLD: NORMAL
SMALL PLATELETS BLD QL SMEAR: ADEQUATE
SODIUM SERPL-SCNC: 137 MMOL/L (ref 136–145)
VARIANT LYMPHS NFR BLD MANUAL: 13 % (ref 19.6–45.3)
WBC MORPH BLD: NORMAL
WBC NRBC COR # BLD AUTO: 14.1 10*3/MM3 (ref 3.4–10.8)

## 2025-07-26 PROCEDURE — 85007 BL SMEAR W/DIFF WBC COUNT: CPT | Performed by: STUDENT IN AN ORGANIZED HEALTH CARE EDUCATION/TRAINING PROGRAM

## 2025-07-26 PROCEDURE — 82570 ASSAY OF URINE CREATININE: CPT | Performed by: STUDENT IN AN ORGANIZED HEALTH CARE EDUCATION/TRAINING PROGRAM

## 2025-07-26 PROCEDURE — G0463 HOSPITAL OUTPT CLINIC VISIT: HCPCS

## 2025-07-26 PROCEDURE — 84156 ASSAY OF PROTEIN URINE: CPT | Performed by: STUDENT IN AN ORGANIZED HEALTH CARE EDUCATION/TRAINING PROGRAM

## 2025-07-26 PROCEDURE — 36415 COLL VENOUS BLD VENIPUNCTURE: CPT | Performed by: STUDENT IN AN ORGANIZED HEALTH CARE EDUCATION/TRAINING PROGRAM

## 2025-07-26 PROCEDURE — 80053 COMPREHEN METABOLIC PANEL: CPT | Performed by: STUDENT IN AN ORGANIZED HEALTH CARE EDUCATION/TRAINING PROGRAM

## 2025-07-26 PROCEDURE — 25010000002 CEFEPIME PER 500 MG: Performed by: STUDENT IN AN ORGANIZED HEALTH CARE EDUCATION/TRAINING PROGRAM

## 2025-07-26 PROCEDURE — 85027 COMPLETE CBC AUTOMATED: CPT | Performed by: STUDENT IN AN ORGANIZED HEALTH CARE EDUCATION/TRAINING PROGRAM

## 2025-07-26 RX ORDER — DIPHENHYDRAMINE HYDROCHLORIDE 50 MG/ML
50 INJECTION, SOLUTION INTRAMUSCULAR; INTRAVENOUS ONCE AS NEEDED
Status: DISCONTINUED | OUTPATIENT
Start: 2025-07-26 | End: 2025-07-27 | Stop reason: HOSPADM

## 2025-07-26 RX ORDER — ACETAMINOPHEN 500 MG
1000 TABLET ORAL ONCE AS NEEDED
Status: DISCONTINUED | OUTPATIENT
Start: 2025-07-26 | End: 2025-07-27 | Stop reason: HOSPADM

## 2025-07-26 RX ADMIN — CEFEPIME 1000 MG: 1 INJECTION, POWDER, FOR SOLUTION INTRAMUSCULAR; INTRAVENOUS at 20:04

## 2025-07-26 NOTE — TELEPHONE ENCOUNTER
Patient called  today regarding recent urine culture results, which reveal MDR K pneumoniae and E coli. Reviewed results with MFM on call Dr. Monte and recommendation is to treat with IV cefepime 1g BID. Per pharmacy, would treat x 3 days at minimum. Called and reviewed with patient - she will come this evening for her first dose. She is undecided as to whether she would like to stay for ongoing doses vs present to triage twice daily for her treatments. Nursing aware that she will present later this evening for her first dose/ possible admission.     Lorena Saavedra MD   Obstetrics and Gynecology  [facility]

## 2025-07-27 ENCOUNTER — HOSPITAL ENCOUNTER (OUTPATIENT)
Facility: HOSPITAL | Age: 35
Discharge: HOME | End: 2025-07-27
Attending: STUDENT IN AN ORGANIZED HEALTH CARE EDUCATION/TRAINING PROGRAM | Admitting: STUDENT IN AN ORGANIZED HEALTH CARE EDUCATION/TRAINING PROGRAM
Payer: COMMERCIAL

## 2025-07-27 VITALS
HEART RATE: 91 BPM | SYSTOLIC BLOOD PRESSURE: 124 MMHG | HEIGHT: 63 IN | BODY MASS INDEX: 29.77 KG/M2 | OXYGEN SATURATION: 98 % | DIASTOLIC BLOOD PRESSURE: 79 MMHG | WEIGHT: 168 LBS | RESPIRATION RATE: 16 BRPM

## 2025-07-27 VITALS
TEMPERATURE: 99.2 F | HEART RATE: 90 BPM | OXYGEN SATURATION: 99 % | DIASTOLIC BLOOD PRESSURE: 82 MMHG | RESPIRATION RATE: 16 BRPM | BODY MASS INDEX: 29.58 KG/M2 | WEIGHT: 167 LBS | SYSTOLIC BLOOD PRESSURE: 125 MMHG

## 2025-07-27 PROCEDURE — G0463 HOSPITAL OUTPT CLINIC VISIT: HCPCS

## 2025-07-27 PROCEDURE — 25010000002 CEFEPIME PER 500 MG: Performed by: STUDENT IN AN ORGANIZED HEALTH CARE EDUCATION/TRAINING PROGRAM

## 2025-07-27 RX ORDER — SODIUM CHLORIDE 0.9 % (FLUSH) 0.9 %
10 SYRINGE (ML) INJECTION AS NEEDED
Status: DISCONTINUED | OUTPATIENT
Start: 2025-07-27 | End: 2025-07-28 | Stop reason: HOSPADM

## 2025-07-27 RX ORDER — SODIUM CHLORIDE 0.9 % (FLUSH) 0.9 %
10 SYRINGE (ML) INJECTION EVERY 12 HOURS SCHEDULED
Status: DISCONTINUED | OUTPATIENT
Start: 2025-07-27 | End: 2025-07-28 | Stop reason: HOSPADM

## 2025-07-27 RX ORDER — ACETAMINOPHEN 500 MG
1000 TABLET ORAL ONCE
Status: DISCONTINUED | OUTPATIENT
Start: 2025-07-27 | End: 2025-07-27 | Stop reason: HOSPADM

## 2025-07-27 RX ORDER — LIDOCAINE HYDROCHLORIDE 10 MG/ML
0.5 INJECTION, SOLUTION INFILTRATION; PERINEURAL ONCE AS NEEDED
Status: DISCONTINUED | OUTPATIENT
Start: 2025-07-27 | End: 2025-07-28 | Stop reason: HOSPADM

## 2025-07-27 RX ORDER — SODIUM CHLORIDE 9 MG/ML
40 INJECTION, SOLUTION INTRAVENOUS AS NEEDED
Status: DISCONTINUED | OUTPATIENT
Start: 2025-07-27 | End: 2025-07-28 | Stop reason: HOSPADM

## 2025-07-27 RX ORDER — DIPHENHYDRAMINE HYDROCHLORIDE 50 MG/ML
50 INJECTION, SOLUTION INTRAMUSCULAR; INTRAVENOUS ONCE
Status: DISCONTINUED | OUTPATIENT
Start: 2025-07-27 | End: 2025-07-27 | Stop reason: HOSPADM

## 2025-07-27 RX ADMIN — CEFEPIME 1000 MG: 1 INJECTION, POWDER, FOR SOLUTION INTRAMUSCULAR; INTRAVENOUS at 20:12

## 2025-07-27 RX ADMIN — CEFEPIME 1000 MG: 1 INJECTION, POWDER, FOR SOLUTION INTRAMUSCULAR; INTRAVENOUS at 07:51

## 2025-07-27 NOTE — NON STRESS TEST
Triage Note - Nursing Documentation  Labor and Delivery Admission Log    Karmen Tracey  : 1990  MRN: [id number]  CSN: [id number]    Date in / Time in:  2025  Time in: 724    Date out / Time out:    Time out: 927    Nurse: Nayeli Pearson RN    Patient Info: She is a 34 y.o. year old  at 27w3d with an CLARE of 10/23/2025, by Last Menstrual Period who was seen on the [facility].    Chief Complaint:   Chief Complaint   Patient presents with    Back Pain     IV antibiotics for UTI diagnosed 25       Provider Instructions / Disposition: Pt presents for IV antibiotics due to UTI diagnosed on 25. Cefepime 1000mg IV given per orders. Pt tolerated well. No s/s of reaction. Pt has hx of reaction to ceclor as a child but has received 2 doses thus far with no reaction/complications. NST appropriate for gestational age. Pt reports active fetal movement. Pt advised to return this evening at 2000 for 3rd dose. Pt educated on reasons to turn to labor duarte and importance of fetal kick counts. Pt verbalized understanding.     Patient Active Problem List   Diagnosis    Acne    Anxiety disorder    Benign ovarian cyst    Elevated cortisol level    Elevated DHEA    Hypertension    Palpitations    Oral contraceptive use    Personal history of leukemia       NST Documentation (Only applicable > 32 weeks): Interpretation A  Comments A: Appropriate for gestational age (25 0920 : Nayeli Pearson, RN)

## 2025-07-27 NOTE — NON STRESS TEST
Triage Note - Nursing Documentation  Labor and Delivery Admission Log    Karmen Tracey  : 1990  MRN: [id number]  CSN: [id number]    Date in / Time in:  2025  Time in:     Date out / Time out:    Time out: 853      Nurse: Uma Schmidt RN    Patient Info: She is a 34 y.o. year old  at 27w2d with an CLARE of 10/23/2025, by Last Menstrual Period who was seen on the [facility].    Chief Complaint:   Chief Complaint   Patient presents with    schedule     Iv antibiotics  For UTI         Provider Instructions / Disposition:   Presented for iv antibiotic for UTI   + fetal movement reactive accels  IV started with 24 jelco  Cefepime 1000 mg given, does have a history of allergy to this antibiotic   Was given this antibiotic at age 5 or 6 and had a rash  Was instructed on allergic reactions and given this antibiotic, no reaction accured stayed an hour after infusion finished with no reaction  Blood pressures noted to be elevated and has a history of chronic blood pressure lab work drawn, cbc, cmp, and urine creative/protein ratio  Results called to Dr Brittany Scott to go home      Pr instructed to return in morning at 0700 for another ifusion.      Patient Active Problem List   Diagnosis    Acne    Anxiety disorder    Benign ovarian cyst    Elevated cortisol level    Elevated DHEA    Hypertension    Palpitations    Oral contraceptive use    Personal history of leukemia       NST Documentation (Only applicable > 32 weeks):

## 2025-07-28 ENCOUNTER — TELEPHONE (OUTPATIENT)
Dept: OBSTETRICS AND GYNECOLOGY | Facility: CLINIC | Age: 35
End: 2025-07-28
Payer: COMMERCIAL

## 2025-07-28 ENCOUNTER — HOSPITAL ENCOUNTER (OUTPATIENT)
Facility: HOSPITAL | Age: 35
Discharge: HOME | End: 2025-07-28
Attending: OBSTETRICS & GYNECOLOGY | Admitting: OBSTETRICS & GYNECOLOGY
Payer: COMMERCIAL

## 2025-07-28 VITALS
SYSTOLIC BLOOD PRESSURE: 125 MMHG | TEMPERATURE: 98.6 F | DIASTOLIC BLOOD PRESSURE: 84 MMHG | WEIGHT: 166.8 LBS | BODY MASS INDEX: 29.55 KG/M2 | HEIGHT: 63 IN | HEART RATE: 102 BPM | OXYGEN SATURATION: 99 %

## 2025-07-28 VITALS
TEMPERATURE: 98.7 F | RESPIRATION RATE: 16 BRPM | DIASTOLIC BLOOD PRESSURE: 93 MMHG | OXYGEN SATURATION: 99 % | HEART RATE: 108 BPM | SYSTOLIC BLOOD PRESSURE: 144 MMHG

## 2025-07-28 PROCEDURE — 25010000002 CEFEPIME PER 500 MG: Performed by: OBSTETRICS & GYNECOLOGY

## 2025-07-28 PROCEDURE — G0463 HOSPITAL OUTPT CLINIC VISIT: HCPCS

## 2025-07-28 RX ORDER — SODIUM CHLORIDE 0.9 % (FLUSH) 0.9 %
10 SYRINGE (ML) INJECTION AS NEEDED
Status: DISCONTINUED | OUTPATIENT
Start: 2025-07-28 | End: 2025-07-29 | Stop reason: HOSPADM

## 2025-07-28 RX ORDER — SODIUM CHLORIDE 0.9 % (FLUSH) 0.9 %
10 SYRINGE (ML) INJECTION AS NEEDED
Status: DISCONTINUED | OUTPATIENT
Start: 2025-07-28 | End: 2025-07-28 | Stop reason: HOSPADM

## 2025-07-28 RX ORDER — DIPHENHYDRAMINE HYDROCHLORIDE 50 MG/ML
50 INJECTION, SOLUTION INTRAMUSCULAR; INTRAVENOUS ONCE
Status: DISCONTINUED | OUTPATIENT
Start: 2025-07-28 | End: 2025-07-28 | Stop reason: HOSPADM

## 2025-07-28 RX ORDER — LIDOCAINE HYDROCHLORIDE 10 MG/ML
0.5 INJECTION, SOLUTION INFILTRATION; PERINEURAL ONCE AS NEEDED
Status: DISCONTINUED | OUTPATIENT
Start: 2025-07-28 | End: 2025-07-28 | Stop reason: HOSPADM

## 2025-07-28 RX ORDER — LIDOCAINE HYDROCHLORIDE 10 MG/ML
0.5 INJECTION, SOLUTION INFILTRATION; PERINEURAL ONCE AS NEEDED
Status: DISCONTINUED | OUTPATIENT
Start: 2025-07-28 | End: 2025-07-29 | Stop reason: HOSPADM

## 2025-07-28 RX ORDER — ACETAMINOPHEN 500 MG
1000 TABLET ORAL ONCE
Status: DISCONTINUED | OUTPATIENT
Start: 2025-07-28 | End: 2025-07-28 | Stop reason: HOSPADM

## 2025-07-28 RX ORDER — SODIUM CHLORIDE 9 MG/ML
40 INJECTION, SOLUTION INTRAVENOUS AS NEEDED
Status: DISCONTINUED | OUTPATIENT
Start: 2025-07-28 | End: 2025-07-28 | Stop reason: HOSPADM

## 2025-07-28 RX ORDER — SODIUM CHLORIDE 0.9 % (FLUSH) 0.9 %
10 SYRINGE (ML) INJECTION EVERY 12 HOURS SCHEDULED
Status: DISCONTINUED | OUTPATIENT
Start: 2025-07-28 | End: 2025-07-28 | Stop reason: HOSPADM

## 2025-07-28 RX ORDER — SODIUM CHLORIDE 0.9 % (FLUSH) 0.9 %
10 SYRINGE (ML) INJECTION EVERY 12 HOURS SCHEDULED
Status: DISCONTINUED | OUTPATIENT
Start: 2025-07-28 | End: 2025-07-29 | Stop reason: HOSPADM

## 2025-07-28 RX ORDER — SODIUM CHLORIDE 9 MG/ML
40 INJECTION, SOLUTION INTRAVENOUS AS NEEDED
Status: DISCONTINUED | OUTPATIENT
Start: 2025-07-28 | End: 2025-07-29 | Stop reason: HOSPADM

## 2025-07-28 RX ADMIN — CEFEPIME 1000 MG: 1 INJECTION, POWDER, FOR SOLUTION INTRAMUSCULAR; INTRAVENOUS at 20:35

## 2025-07-28 RX ADMIN — CEFEPIME 1000 MG: 1 INJECTION, POWDER, FOR SOLUTION INTRAMUSCULAR; INTRAVENOUS at 08:39

## 2025-07-28 NOTE — NON STRESS TEST
Triage Note - Nursing Documentation  Labor and Delivery Admission Log    Karmen Tracey  : 1990  MRN: [id number]  CSN: [id number]    Date in / Time in:  2025  Time in:     Date out / Time out:    Time out:     Nurse: Johnathan Pfeiffer RN    Patient Info: She is a 34 y.o. year old  at 27w3d with an CLARE of 10/23/2025, by Last Menstrual Period who was seen on the [facility].    Chief Complaint:   Chief Complaint   Patient presents with    Urinary Tract Infection     Pt here in triage for another dose of IV antibiotics for UTI. Pt reports frequency in urination without much output.       Provider Instructions / Disposition: Pt came in for IV antibiotics as ordered by Dr Saavedra. Pt has 3 doses of antibiotics left after this dose. Pt will be back in the morning around 3457-2206 for next dose. No allergic reactions noted. Fetus reactive for 27 weeks on EFM. No contractions noted.    Patient Active Problem List   Diagnosis    Acne    Anxiety disorder    Benign ovarian cyst    Elevated cortisol level    Elevated DHEA    Hypertension    Palpitations    Oral contraceptive use    Personal history of leukemia       NST Documentation (Only applicable > 32 weeks):

## 2025-07-28 NOTE — NON STRESS TEST
Triage Note - Nursing Documentation  Labor and Delivery Admission Log    Karmen Tracey  : 1990  MRN: [id number]  CSN: [id number]    Date in / Time in:  2025  Time in: 754    Date out / Time out:    Time out: 955    Nurse: Trinidad Parra RN    Patient Info: She is a 34 y.o. year old  at 27w4d with an CLARE of 10/23/2025, by Last Menstrual Period who was seen on the [facility].    Chief Complaint:   Chief Complaint   Patient presents with    Non-stress Test    Urinary Tract Infection     Here for antibiotics       Provider Instructions / Disposition: Fetal tracing reassuring, patient report positive fetal movement. Patient received dose of antibiotics, patient to return tonight for another dose and again in the morning for next dose. Discharge home, instructed on fetal kick counts, s/s of labor, reasons to return to labor duarte and to follow up with OB provider. Patient verbalized understanding.     Patient Active Problem List   Diagnosis    Acne    Anxiety disorder    Benign ovarian cyst    Elevated cortisol level    Elevated DHEA    Hypertension    Palpitations    Oral contraceptive use    Personal history of leukemia       NST Documentation (Only applicable > 32 weeks):

## 2025-07-28 NOTE — TELEPHONE ENCOUNTER
Patient has been doing twice a day IV antibiotics and she is wondering if she still needs the Bactrim that you sent in? Or when does she start it. She has two more IV antibiotics.

## 2025-07-29 ENCOUNTER — HOSPITAL ENCOUNTER (OUTPATIENT)
Facility: HOSPITAL | Age: 35
Discharge: HOME | End: 2025-07-29
Attending: MIDWIFE | Admitting: MIDWIFE
Payer: COMMERCIAL

## 2025-07-29 VITALS
RESPIRATION RATE: 16 BRPM | DIASTOLIC BLOOD PRESSURE: 97 MMHG | WEIGHT: 166.8 LBS | OXYGEN SATURATION: 99 % | HEART RATE: 101 BPM | BODY MASS INDEX: 29.55 KG/M2 | TEMPERATURE: 98.6 F | HEIGHT: 63 IN | SYSTOLIC BLOOD PRESSURE: 130 MMHG

## 2025-07-29 PROCEDURE — 25010000002 CEFEPIME PER 500 MG: Performed by: MIDWIFE

## 2025-07-29 PROCEDURE — G0463 HOSPITAL OUTPT CLINIC VISIT: HCPCS

## 2025-07-29 RX ADMIN — CEFEPIME 1000 MG: 1 INJECTION, POWDER, FOR SOLUTION INTRAMUSCULAR; INTRAVENOUS at 08:09

## 2025-07-29 NOTE — NON STRESS TEST
Triage Note - Nursing Documentation  Labor and Delivery Admission Log    Karmen Tracey  : 1990  MRN: [id number]  CSN: [id number]    Date in / Time in:  2025  Time in:     Date out / Time out:    Time out:     Nurse: Johnathan Pfeiffer, RN    Patient Info: She is a 34 y.o. year old  at 27w4d with an CLARE of 10/23/2025, by Last Menstrual Period who was seen on the [facility].    Chief Complaint:   Chief Complaint   Patient presents with    Urinary Tract Infection     Pt here in triage for continued IV antibiotic treatment for a UTI.       Provider Instructions / Disposition: Pt received Cefepime 1 gram IV per order and was D/C'ed home. Pt will follow up in labor duarte triage in the morning for final dose of Cefepime.     Patient Active Problem List   Diagnosis    Acne    Anxiety disorder    Benign ovarian cyst    Elevated cortisol level    Elevated DHEA    Hypertension    Palpitations    Oral contraceptive use    Personal history of leukemia       NST Documentation (Only applicable > 32 weeks):

## 2025-07-29 NOTE — NON STRESS TEST
Triage Note - Nursing Documentation  Labor and Delivery Admission Log    Karmen Tracey  : 1990  MRN: [id number]  CSN: [id number]    Date in / Time in:  2025  Time in: 743    Date out / Time out:    Time out: 1030    Nurse: Nayeli Pearson RN    Patient Info: She is a 34 y.o. year old  at 27w5d with an CLARE of 10/23/2025, by Last Menstrual Period who was seen on the [facility].    Chief Complaint:   Chief Complaint   Patient presents with    Non-stress Test     Last dose IV antibiotics for UTI        Provider Instructions / Disposition: Pt presents for final dose of IV cefepime for UTI. NST appropriate for gestational age. Pt denies any vaginal bleeding, leaking of fluid, or contractions. Pt reports active fetal movement. Abdomen soft upon palpation. NST findings discussed with CNM. Per CNM, ok to discharge home with orders for PO antibiotics and for pt to keep next follow up appointment. Pt educated on signs of labor, importance of fetal kick counts, and PIH warning signs. Pt verbalized understanding.   Patient Active Problem List   Diagnosis    Acne    Anxiety disorder    Benign ovarian cyst    Elevated cortisol level    Elevated DHEA    Hypertension    Palpitations    Oral contraceptive use    Personal history of leukemia       NST Documentation (Only applicable > 32 weeks): Interpretation A  Comments A: Appropriate for gestational age (25 1030 : Nayeli Pearson, RN)

## 2025-07-30 DIAGNOSIS — R39.9 URINARY TRACT INFECTION SYMPTOMS: Primary | ICD-10-CM

## 2025-08-01 LAB
APPEARANCE UR: CLEAR
BACTERIA #/AREA URNS HPF: ABNORMAL /HPF
BACTERIA UR CULT: NO GROWTH
BACTERIA UR CULT: NORMAL
BILIRUB UR QL STRIP: NEGATIVE
CASTS URNS MICRO: ABNORMAL
COLOR UR: YELLOW
EPI CELLS #/AREA URNS HPF: ABNORMAL /HPF
GLUCOSE UR QL STRIP: NEGATIVE
HGB UR QL STRIP: NEGATIVE
KETONES UR QL STRIP: NEGATIVE
LEUKOCYTE ESTERASE UR QL STRIP: NEGATIVE
NITRITE UR QL STRIP: NEGATIVE
PH UR STRIP: 7 [PH] (ref 5–8)
PROT UR QL STRIP: NEGATIVE
RBC #/AREA URNS HPF: ABNORMAL /HPF
SP GR UR STRIP: NORMAL (ref 1–1.03)
UROBILINOGEN UR STRIP-MCNC: NORMAL MG/DL
WBC #/AREA URNS HPF: ABNORMAL /HPF

## 2025-08-12 ENCOUNTER — ROUTINE PRENATAL (OUTPATIENT)
Dept: OBSTETRICS AND GYNECOLOGY | Facility: CLINIC | Age: 35
End: 2025-08-12
Payer: COMMERCIAL

## 2025-08-12 VITALS — WEIGHT: 170 LBS | SYSTOLIC BLOOD PRESSURE: 158 MMHG | DIASTOLIC BLOOD PRESSURE: 92 MMHG | BODY MASS INDEX: 30.11 KG/M2

## 2025-08-12 DIAGNOSIS — O10.919 HTN IN PREGNANCY, CHRONIC: ICD-10-CM

## 2025-08-12 DIAGNOSIS — Z34.83 PRENATAL CARE, SUBSEQUENT PREGNANCY IN THIRD TRIMESTER: Primary | ICD-10-CM

## 2025-08-12 DIAGNOSIS — R30.0 DYSURIA: ICD-10-CM

## 2025-08-13 PROBLEM — O10.919 HTN IN PREGNANCY, CHRONIC: Status: ACTIVE | Noted: 2025-08-13

## 2025-08-13 LAB
APPEARANCE UR: CLEAR
BACTERIA #/AREA URNS HPF: ABNORMAL /[HPF]
BILIRUB UR QL STRIP: NEGATIVE
CASTS URNS QL MICRO: ABNORMAL /LPF
COLOR UR: YELLOW
EPI CELLS #/AREA URNS HPF: >10 /HPF (ref 0–10)
GLUCOSE UR QL STRIP: NEGATIVE
HGB UR QL STRIP: NEGATIVE
KETONES UR QL STRIP: NEGATIVE
LEUKOCYTE ESTERASE UR QL STRIP: NEGATIVE
MICRO URNS: NORMAL
MICRO URNS: NORMAL
NITRITE UR QL STRIP: NEGATIVE
PH UR STRIP: 7 [PH] (ref 5–7.5)
PROT UR QL STRIP: NEGATIVE
RBC #/AREA URNS HPF: ABNORMAL /HPF (ref 0–2)
SP GR UR STRIP: 1.01 (ref 1–1.03)
URINALYSIS REFLEX: NORMAL
UROBILINOGEN UR STRIP-MCNC: 0.2 MG/DL (ref 0.2–1)
WBC #/AREA URNS HPF: ABNORMAL /HPF (ref 0–5)

## 2025-08-18 ENCOUNTER — ROUTINE PRENATAL (OUTPATIENT)
Dept: OBSTETRICS AND GYNECOLOGY | Facility: CLINIC | Age: 35
End: 2025-08-18
Payer: COMMERCIAL

## 2025-08-18 VITALS — WEIGHT: 169.3 LBS | SYSTOLIC BLOOD PRESSURE: 160 MMHG | DIASTOLIC BLOOD PRESSURE: 98 MMHG | BODY MASS INDEX: 29.99 KG/M2

## 2025-08-18 DIAGNOSIS — O09.93 ENCOUNTER FOR SUPERVISION OF HIGH RISK PREGNANCY IN THIRD TRIMESTER, ANTEPARTUM: Primary | ICD-10-CM

## 2025-08-18 DIAGNOSIS — R35.0 URINARY FREQUENCY: ICD-10-CM

## 2025-08-18 DIAGNOSIS — R39.9 URINARY TRACT INFECTION SYMPTOMS: ICD-10-CM

## 2025-08-18 DIAGNOSIS — K21.9 GASTROESOPHAGEAL REFLUX DISEASE WITHOUT ESOPHAGITIS: ICD-10-CM

## 2025-08-18 DIAGNOSIS — O10.919 HTN IN PREGNANCY, CHRONIC: ICD-10-CM

## 2025-08-18 PROCEDURE — 99214 OFFICE O/P EST MOD 30 MIN: CPT | Performed by: OBSTETRICS & GYNECOLOGY

## 2025-08-21 ENCOUNTER — TELEPHONE (OUTPATIENT)
Dept: OBSTETRICS AND GYNECOLOGY | Facility: CLINIC | Age: 35
End: 2025-08-21
Payer: COMMERCIAL

## 2025-08-21 LAB
ALBUMIN SERPL-MCNC: 3.7 G/DL (ref 3.5–5.2)
ALBUMIN/GLOB SERPL: 1.5 G/DL
ALP SERPL-CCNC: 95 U/L (ref 39–117)
ALT SERPL-CCNC: 20 U/L (ref 1–33)
AST SERPL-CCNC: 25 U/L (ref 1–32)
BACTERIA UR CULT: ABNORMAL
BACTERIA UR CULT: ABNORMAL
BASOPHILS # BLD AUTO: 0.03 10*3/MM3 (ref 0–0.2)
BASOPHILS NFR BLD AUTO: 0.3 % (ref 0–1.5)
BILIRUB SERPL-MCNC: <0.2 MG/DL (ref 0–1.2)
BUN SERPL-MCNC: 6 MG/DL (ref 6–20)
BUN/CREAT SERPL: 8.8 (ref 7–25)
CALCIUM SERPL-MCNC: 9.2 MG/DL (ref 8.6–10.5)
CHLORIDE SERPL-SCNC: 102 MMOL/L (ref 98–107)
CO2 SERPL-SCNC: 21.7 MMOL/L (ref 22–29)
CREAT SERPL-MCNC: 0.68 MG/DL (ref 0.57–1)
EGFRCR SERPLBLD CKD-EPI 2021: 118.1 ML/MIN/1.73
EOSINOPHIL # BLD AUTO: 0.14 10*3/MM3 (ref 0–0.4)
EOSINOPHIL NFR BLD AUTO: 1.4 % (ref 0.3–6.2)
ERYTHROCYTE [DISTWIDTH] IN BLOOD BY AUTOMATED COUNT: 12.2 % (ref 12.3–15.4)
GLOBULIN SER CALC-MCNC: 2.5 GM/DL
GLUCOSE SERPL-MCNC: 82 MG/DL (ref 65–99)
HCT VFR BLD AUTO: 34.9 % (ref 34–46.6)
HGB BLD-MCNC: 11.7 G/DL (ref 12–15.9)
IMM GRANULOCYTES # BLD AUTO: 0.08 10*3/MM3 (ref 0–0.05)
IMM GRANULOCYTES NFR BLD AUTO: 0.8 % (ref 0–0.5)
LYMPHOCYTES # BLD AUTO: 1.41 10*3/MM3 (ref 0.7–3.1)
LYMPHOCYTES NFR BLD AUTO: 14.2 % (ref 19.6–45.3)
MCH RBC QN AUTO: 31.5 PG (ref 26.6–33)
MCHC RBC AUTO-ENTMCNC: 33.5 G/DL (ref 31.5–35.7)
MCV RBC AUTO: 94.1 FL (ref 79–97)
MONOCYTES # BLD AUTO: 0.61 10*3/MM3 (ref 0.1–0.9)
MONOCYTES NFR BLD AUTO: 6.1 % (ref 5–12)
NEUTROPHILS # BLD AUTO: 7.69 10*3/MM3 (ref 1.7–7)
NEUTROPHILS NFR BLD AUTO: 77.2 % (ref 42.7–76)
NRBC BLD AUTO-RTO: 0 /100 WBC (ref 0–0.2)
OTHER ANTIBIOTIC SUSC ISLT: ABNORMAL
PLATELET # BLD AUTO: 274 10*3/MM3 (ref 140–450)
POTASSIUM SERPL-SCNC: 3.9 MMOL/L (ref 3.5–5.2)
PROT SERPL-MCNC: 6.2 G/DL (ref 6–8.5)
RBC # BLD AUTO: 3.71 10*6/MM3 (ref 3.77–5.28)
SODIUM SERPL-SCNC: 138 MMOL/L (ref 136–145)
WBC # BLD AUTO: 9.96 10*3/MM3 (ref 3.4–10.8)

## 2025-08-22 ENCOUNTER — RESULTS FOLLOW-UP (OUTPATIENT)
Dept: OBSTETRICS AND GYNECOLOGY | Facility: CLINIC | Age: 35
End: 2025-08-22
Payer: COMMERCIAL

## 2025-08-22 ENCOUNTER — TELEPHONE (OUTPATIENT)
Dept: OBSTETRICS AND GYNECOLOGY | Facility: CLINIC | Age: 35
End: 2025-08-22
Payer: COMMERCIAL

## 2025-08-22 RX ORDER — NITROFURANTOIN 25; 75 MG/1; MG/1
100 CAPSULE ORAL 2 TIMES DAILY
Qty: 14 CAPSULE | Refills: 0 | Status: SHIPPED | OUTPATIENT
Start: 2025-08-22 | End: 2025-08-29

## 2025-08-27 ENCOUNTER — ROUTINE PRENATAL (OUTPATIENT)
Dept: OBSTETRICS AND GYNECOLOGY | Facility: CLINIC | Age: 35
End: 2025-08-27
Payer: COMMERCIAL

## 2025-08-27 VITALS — SYSTOLIC BLOOD PRESSURE: 156 MMHG | DIASTOLIC BLOOD PRESSURE: 98 MMHG | BODY MASS INDEX: 30.01 KG/M2 | WEIGHT: 169.4 LBS

## 2025-08-27 DIAGNOSIS — O10.919 CHRONIC HYPERTENSION IN PREGNANCY: Primary | ICD-10-CM

## 2025-08-27 RX ORDER — NITROFURANTOIN 25; 75 MG/1; MG/1
100 CAPSULE ORAL DAILY
Qty: 30 CAPSULE | Refills: 4 | Status: SHIPPED | OUTPATIENT
Start: 2025-08-27

## 2025-08-28 LAB
ALBUMIN SERPL-MCNC: 3.7 G/DL (ref 3.5–5.2)
ALBUMIN/GLOB SERPL: 1.4 G/DL
ALP SERPL-CCNC: 104 U/L (ref 39–117)
ALT SERPL-CCNC: 28 U/L (ref 1–33)
AST SERPL-CCNC: 31 U/L (ref 1–32)
BASOPHILS # BLD AUTO: 0.02 10*3/MM3 (ref 0–0.2)
BASOPHILS NFR BLD AUTO: 0.2 % (ref 0–1.5)
BILIRUB SERPL-MCNC: <0.2 MG/DL (ref 0–1.2)
BUN SERPL-MCNC: 6 MG/DL (ref 6–20)
BUN/CREAT SERPL: 10 (ref 7–25)
CALCIUM SERPL-MCNC: 9.2 MG/DL (ref 8.6–10.5)
CHLORIDE SERPL-SCNC: 104 MMOL/L (ref 98–107)
CO2 SERPL-SCNC: 20.7 MMOL/L (ref 22–29)
CREAT SERPL-MCNC: 0.6 MG/DL (ref 0.57–1)
EGFRCR SERPLBLD CKD-EPI 2021: 121.7 ML/MIN/1.73
EOSINOPHIL # BLD AUTO: 0.09 10*3/MM3 (ref 0–0.4)
EOSINOPHIL NFR BLD AUTO: 0.9 % (ref 0.3–6.2)
ERYTHROCYTE [DISTWIDTH] IN BLOOD BY AUTOMATED COUNT: 12.6 % (ref 12.3–15.4)
GLOBULIN SER CALC-MCNC: 2.6 GM/DL
GLUCOSE SERPL-MCNC: 90 MG/DL (ref 65–99)
HCT VFR BLD AUTO: 35.6 % (ref 34–46.6)
HGB BLD-MCNC: 11.6 G/DL (ref 12–15.9)
IMM GRANULOCYTES # BLD AUTO: 0.09 10*3/MM3 (ref 0–0.05)
IMM GRANULOCYTES NFR BLD AUTO: 0.9 % (ref 0–0.5)
LYMPHOCYTES # BLD AUTO: 1.15 10*3/MM3 (ref 0.7–3.1)
LYMPHOCYTES NFR BLD AUTO: 12.1 % (ref 19.6–45.3)
MCH RBC QN AUTO: 31.8 PG (ref 26.6–33)
MCHC RBC AUTO-ENTMCNC: 32.6 G/DL (ref 31.5–35.7)
MCV RBC AUTO: 97.5 FL (ref 79–97)
MONOCYTES # BLD AUTO: 0.57 10*3/MM3 (ref 0.1–0.9)
MONOCYTES NFR BLD AUTO: 6 % (ref 5–12)
NEUTROPHILS # BLD AUTO: 7.6 10*3/MM3 (ref 1.7–7)
NEUTROPHILS NFR BLD AUTO: 79.9 % (ref 42.7–76)
NRBC BLD AUTO-RTO: 0 /100 WBC (ref 0–0.2)
PLATELET # BLD AUTO: 266 10*3/MM3 (ref 140–450)
POTASSIUM SERPL-SCNC: 4.2 MMOL/L (ref 3.5–5.2)
PROT SERPL-MCNC: 6.3 G/DL (ref 6–8.5)
RBC # BLD AUTO: 3.65 10*6/MM3 (ref 3.77–5.28)
SODIUM SERPL-SCNC: 138 MMOL/L (ref 136–145)
WBC # BLD AUTO: 9.52 10*3/MM3 (ref 3.4–10.8)